# Patient Record
Sex: FEMALE | Race: OTHER | URBAN - METROPOLITAN AREA
[De-identification: names, ages, dates, MRNs, and addresses within clinical notes are randomized per-mention and may not be internally consistent; named-entity substitution may affect disease eponyms.]

---

## 2018-08-14 ENCOUNTER — EMERGENCY (EMERGENCY)
Facility: HOSPITAL | Age: 33
LOS: 1 days | Discharge: HOME | End: 2018-08-14
Attending: EMERGENCY MEDICINE

## 2018-08-14 VITALS
OXYGEN SATURATION: 98 % | DIASTOLIC BLOOD PRESSURE: 71 MMHG | HEART RATE: 63 BPM | TEMPERATURE: 96 F | SYSTOLIC BLOOD PRESSURE: 97 MMHG | RESPIRATION RATE: 20 BRPM

## 2018-08-14 DIAGNOSIS — N94.6 DYSMENORRHEA, UNSPECIFIED: ICD-10-CM

## 2018-08-14 DIAGNOSIS — Z98.890 OTHER SPECIFIED POSTPROCEDURAL STATES: ICD-10-CM

## 2018-08-14 DIAGNOSIS — R30.0 DYSURIA: ICD-10-CM

## 2018-08-14 DIAGNOSIS — Z98.891 HISTORY OF UTERINE SCAR FROM PREVIOUS SURGERY: Chronic | ICD-10-CM

## 2018-08-14 DIAGNOSIS — R10.9 UNSPECIFIED ABDOMINAL PAIN: ICD-10-CM

## 2018-08-14 DIAGNOSIS — Z87.42 PERSONAL HISTORY OF OTHER DISEASES OF THE FEMALE GENITAL TRACT: ICD-10-CM

## 2018-08-14 LAB
ALBUMIN SERPL ELPH-MCNC: 4.5 G/DL — SIGNIFICANT CHANGE UP (ref 3.5–5.2)
ALP SERPL-CCNC: 84 U/L — SIGNIFICANT CHANGE UP (ref 30–115)
ALT FLD-CCNC: 12 U/L — SIGNIFICANT CHANGE UP (ref 0–41)
ANION GAP SERPL CALC-SCNC: 14 MMOL/L — SIGNIFICANT CHANGE UP (ref 7–14)
APPEARANCE UR: ABNORMAL
AST SERPL-CCNC: 13 U/L — SIGNIFICANT CHANGE UP (ref 0–41)
BACTERIA # UR AUTO: ABNORMAL /HPF
BASOPHILS # BLD AUTO: 0.03 K/UL — SIGNIFICANT CHANGE UP (ref 0–0.2)
BASOPHILS NFR BLD AUTO: 0.7 % — SIGNIFICANT CHANGE UP (ref 0–1)
BILIRUB DIRECT SERPL-MCNC: <0.2 MG/DL — SIGNIFICANT CHANGE UP (ref 0–0.2)
BILIRUB INDIRECT FLD-MCNC: >0.2 MG/DL — SIGNIFICANT CHANGE UP (ref 0.2–1.2)
BILIRUB SERPL-MCNC: 0.4 MG/DL — SIGNIFICANT CHANGE UP (ref 0.2–1.2)
BILIRUB UR-MCNC: NEGATIVE — SIGNIFICANT CHANGE UP
BUN SERPL-MCNC: 13 MG/DL — SIGNIFICANT CHANGE UP (ref 10–20)
CALCIUM SERPL-MCNC: 9.3 MG/DL — SIGNIFICANT CHANGE UP (ref 8.5–10.1)
CHLORIDE SERPL-SCNC: 101 MMOL/L — SIGNIFICANT CHANGE UP (ref 98–110)
CO2 SERPL-SCNC: 22 MMOL/L — SIGNIFICANT CHANGE UP (ref 17–32)
COLOR SPEC: YELLOW — SIGNIFICANT CHANGE UP
CREAT SERPL-MCNC: 0.5 MG/DL — LOW (ref 0.7–1.5)
DIFF PNL FLD: ABNORMAL
EOSINOPHIL # BLD AUTO: 0.14 K/UL — SIGNIFICANT CHANGE UP (ref 0–0.7)
EOSINOPHIL NFR BLD AUTO: 3.4 % — SIGNIFICANT CHANGE UP (ref 0–8)
EPI CELLS # UR: ABNORMAL /HPF
GLUCOSE SERPL-MCNC: 101 MG/DL — HIGH (ref 70–99)
GLUCOSE UR QL: NEGATIVE — SIGNIFICANT CHANGE UP
HCT VFR BLD CALC: 42.3 % — SIGNIFICANT CHANGE UP (ref 37–47)
HGB BLD-MCNC: 13.7 G/DL — SIGNIFICANT CHANGE UP (ref 12–16)
IMM GRANULOCYTES NFR BLD AUTO: 0.2 % — SIGNIFICANT CHANGE UP (ref 0.1–0.3)
KETONES UR-MCNC: NEGATIVE — SIGNIFICANT CHANGE UP
LACTATE SERPL-SCNC: 2.2 MMOL/L — SIGNIFICANT CHANGE UP (ref 0.5–2.2)
LEUKOCYTE ESTERASE UR-ACNC: ABNORMAL
LIDOCAIN IGE QN: 36 U/L — SIGNIFICANT CHANGE UP (ref 7–60)
LYMPHOCYTES # BLD AUTO: 1.34 K/UL — SIGNIFICANT CHANGE UP (ref 1.2–3.4)
LYMPHOCYTES # BLD AUTO: 32.7 % — SIGNIFICANT CHANGE UP (ref 20.5–51.1)
MCHC RBC-ENTMCNC: 26.9 PG — LOW (ref 27–31)
MCHC RBC-ENTMCNC: 32.4 G/DL — SIGNIFICANT CHANGE UP (ref 32–37)
MCV RBC AUTO: 82.9 FL — SIGNIFICANT CHANGE UP (ref 81–99)
MONOCYTES # BLD AUTO: 0.26 K/UL — SIGNIFICANT CHANGE UP (ref 0.1–0.6)
MONOCYTES NFR BLD AUTO: 6.3 % — SIGNIFICANT CHANGE UP (ref 1.7–9.3)
NEUTROPHILS # BLD AUTO: 2.32 K/UL — SIGNIFICANT CHANGE UP (ref 1.4–6.5)
NEUTROPHILS NFR BLD AUTO: 56.7 % — SIGNIFICANT CHANGE UP (ref 42.2–75.2)
NITRITE UR-MCNC: NEGATIVE — SIGNIFICANT CHANGE UP
NRBC # BLD: 0 /100 WBCS — SIGNIFICANT CHANGE UP (ref 0–0)
PH UR: 5.5 — SIGNIFICANT CHANGE UP (ref 5–8)
PLATELET # BLD AUTO: 235 K/UL — SIGNIFICANT CHANGE UP (ref 130–400)
POTASSIUM SERPL-MCNC: 4.1 MMOL/L — SIGNIFICANT CHANGE UP (ref 3.5–5)
POTASSIUM SERPL-SCNC: 4.1 MMOL/L — SIGNIFICANT CHANGE UP (ref 3.5–5)
PROT SERPL-MCNC: 7.3 G/DL — SIGNIFICANT CHANGE UP (ref 6–8)
PROT UR-MCNC: NEGATIVE — SIGNIFICANT CHANGE UP
RBC # BLD: 5.1 M/UL — SIGNIFICANT CHANGE UP (ref 4.2–5.4)
RBC # FLD: 12.3 % — SIGNIFICANT CHANGE UP (ref 11.5–14.5)
RBC CASTS # UR COMP ASSIST: ABNORMAL /HPF
SODIUM SERPL-SCNC: 137 MMOL/L — SIGNIFICANT CHANGE UP (ref 135–146)
SP GR SPEC: >=1.03 — SIGNIFICANT CHANGE UP (ref 1.01–1.03)
UROBILINOGEN FLD QL: 0.2 — SIGNIFICANT CHANGE UP (ref 0.2–0.2)
WBC # BLD: 4.1 K/UL — LOW (ref 4.8–10.8)
WBC # FLD AUTO: 4.1 K/UL — LOW (ref 4.8–10.8)
WBC UR QL: SIGNIFICANT CHANGE UP /HPF

## 2018-08-14 RX ORDER — KETOROLAC TROMETHAMINE 30 MG/ML
30 SYRINGE (ML) INJECTION ONCE
Qty: 0 | Refills: 0 | Status: DISCONTINUED | OUTPATIENT
Start: 2018-08-14 | End: 2018-08-14

## 2018-08-14 RX ORDER — SODIUM CHLORIDE 9 MG/ML
1000 INJECTION INTRAMUSCULAR; INTRAVENOUS; SUBCUTANEOUS ONCE
Qty: 0 | Refills: 0 | Status: COMPLETED | OUTPATIENT
Start: 2018-08-14 | End: 2018-08-14

## 2018-08-14 RX ORDER — IBUPROFEN 200 MG
1 TABLET ORAL
Qty: 20 | Refills: 0
Start: 2018-08-14 | End: 2018-08-18

## 2018-08-14 RX ADMIN — SODIUM CHLORIDE 1000 MILLILITER(S): 9 INJECTION INTRAMUSCULAR; INTRAVENOUS; SUBCUTANEOUS at 06:20

## 2018-08-14 RX ADMIN — Medication 30 MILLIGRAM(S): at 08:53

## 2018-08-14 NOTE — ED PROVIDER NOTE - ATTENDING CONTRIBUTION TO CARE
33y f h/o ovarian cysts p/w LLQ pain since 2am. Constant, occ radiating up L side of abd. Also endorses recent dysuria. Denies f/c, cp/sob, nvd, hematuria, vag discharge or rash. Ob hx - , C/s x 2. LMP 4d ago. PE: young f nad, ncat, neck supple, rrr nl s1s2 no mrg, ctab no wrr, abd soft nd +llq tenderness rest non-tender no palpable masses no rgr, no cvat, ext nl. a/p: LLQ/?flank pain, dysuria, h/o ovarian cysts - urine, labs, pelvic US, reassess.

## 2018-08-14 NOTE — ED PROVIDER NOTE - PHYSICAL EXAMINATION
VITAL SIGNS: I have reviewed nursing notes and confirm.  CONSTITUTIONAL: Well-developed; well-nourished; in no acute distress.  SKIN: Skin exam is warm and dry, no acute rash.  HEAD: Normocephalic; atraumatic.  EYES: Conjunctiva and sclera clear.  ENT: No nasal discharge; airway clear.   NECK: Supple; non tender.  CARD: S1, S2 normal; no murmurs, gallops, or rubs. Regular rate and rhythm.  RESP: No wheezes, rales or rhonchi. Speaking in full sentences.   ABD: Normal bowel sounds; soft; non-distended; (+) mild left pelvic TTP. No rebound or guarding. No CVA tenderness.   EXT: Normal ROM. No clubbing, cyanosis or edema.  NEURO: Alert, oriented. Grossly unremarkable. No focal deficits.

## 2018-08-14 NOTE — ED PROVIDER NOTE - MEDICAL DECISION MAKING DETAILS
Pain improved.  U/S shows no signs of torsion.  UA with blood but patient currently menstruating.  Will give NSAIDs and d/c home.  Has GYN follow up on Friday.

## 2018-08-14 NOTE — ED ADULT TRIAGE NOTE - CHIEF COMPLAINT QUOTE
Patient presents to ED with complaints of left lower abdominal pain starting 2 hours ago. LMP 4 days ago. Patient alert and in no distress in triage.
Oriented - self; Oriented - place; Oriented - time

## 2018-08-14 NOTE — ED PROVIDER NOTE - OBJECTIVE STATEMENT
32 yo F with PMHx of ovarian cyst presents to the ED c/o left lower abdominal pain that started last night. Pain was been intermittent since onset. Pt describes the pain as sharp. Pt took Tylenol with mild relief of symptoms. Pt is currently on her menstrual period. Pt denies fever, chills, nausea, vomiting, diarrhea, headache, dizziness, weakness, chest pain, SOB, back pain, LOC, trauma, urinary symptoms, cough, calf pain/swelling, recent surgery. 32 yo F with PMHx of ovarian cyst presents to the ED c/o left lower abdominal pain that started last night. Pain was been intermittent since onset. Pt describes the pain as sharp. She admits to associated burning with urination. Pt took Tylenol with mild relief of symptoms. Pt is currently on her menstrual period. Pt denies fever, chills, nausea, vomiting, diarrhea, headache, dizziness, weakness, chest pain, SOB, back pain, LOC, trauma, cough, calf pain/swelling, recent surgery.

## 2018-08-14 NOTE — ED ADULT NURSE NOTE - CHIEF COMPLAINT QUOTE
Patient presents to ED with complaints of left lower abdominal pain starting 2 hours ago. LMP 4 days ago. Patient alert and in no distress in triage.

## 2018-08-14 NOTE — ED ADULT NURSE NOTE - OBJECTIVE STATEMENT
Pt presents with LLQ pain x 2 hours. Pt states pain is intermittent. Pt currently on her period. Pt denies chest pain, sob, chills, fever, n/v. Pt with hx of ovarian cyst and

## 2019-03-02 ENCOUNTER — EMERGENCY (EMERGENCY)
Facility: HOSPITAL | Age: 34
LOS: 0 days | Discharge: HOME | End: 2019-03-02
Attending: EMERGENCY MEDICINE | Admitting: EMERGENCY MEDICINE

## 2019-03-02 VITALS — TEMPERATURE: 96 F

## 2019-03-02 VITALS
SYSTOLIC BLOOD PRESSURE: 124 MMHG | DIASTOLIC BLOOD PRESSURE: 70 MMHG | TEMPERATURE: 94 F | OXYGEN SATURATION: 98 % | RESPIRATION RATE: 18 BRPM | HEART RATE: 97 BPM

## 2019-03-02 DIAGNOSIS — R10.13 EPIGASTRIC PAIN: ICD-10-CM

## 2019-03-02 DIAGNOSIS — R11.2 NAUSEA WITH VOMITING, UNSPECIFIED: ICD-10-CM

## 2019-03-02 DIAGNOSIS — R19.7 DIARRHEA, UNSPECIFIED: ICD-10-CM

## 2019-03-02 DIAGNOSIS — Z98.891 HISTORY OF UTERINE SCAR FROM PREVIOUS SURGERY: Chronic | ICD-10-CM

## 2019-03-02 LAB
ALBUMIN SERPL ELPH-MCNC: 5 G/DL — SIGNIFICANT CHANGE UP (ref 3.5–5.2)
ALP SERPL-CCNC: 91 U/L — SIGNIFICANT CHANGE UP (ref 30–115)
ALT FLD-CCNC: 16 U/L — SIGNIFICANT CHANGE UP (ref 0–41)
ANION GAP SERPL CALC-SCNC: 15 MMOL/L — HIGH (ref 7–14)
AST SERPL-CCNC: 15 U/L — SIGNIFICANT CHANGE UP (ref 0–41)
BASOPHILS # BLD AUTO: 0.02 K/UL — SIGNIFICANT CHANGE UP (ref 0–0.2)
BASOPHILS NFR BLD AUTO: 0.2 % — SIGNIFICANT CHANGE UP (ref 0–1)
BILIRUB SERPL-MCNC: 0.3 MG/DL — SIGNIFICANT CHANGE UP (ref 0.2–1.2)
BUN SERPL-MCNC: 17 MG/DL — SIGNIFICANT CHANGE UP (ref 10–20)
CALCIUM SERPL-MCNC: 9.9 MG/DL — SIGNIFICANT CHANGE UP (ref 8.5–10.1)
CHLORIDE SERPL-SCNC: 102 MMOL/L — SIGNIFICANT CHANGE UP (ref 98–110)
CO2 SERPL-SCNC: 23 MMOL/L — SIGNIFICANT CHANGE UP (ref 17–32)
CREAT SERPL-MCNC: 0.7 MG/DL — SIGNIFICANT CHANGE UP (ref 0.7–1.5)
EOSINOPHIL # BLD AUTO: 0.09 K/UL — SIGNIFICANT CHANGE UP (ref 0–0.7)
EOSINOPHIL NFR BLD AUTO: 0.7 % — SIGNIFICANT CHANGE UP (ref 0–8)
GLUCOSE SERPL-MCNC: 110 MG/DL — HIGH (ref 70–99)
HCG SERPL QL: NEGATIVE — SIGNIFICANT CHANGE UP
HCT VFR BLD CALC: 46.4 % — SIGNIFICANT CHANGE UP (ref 37–47)
HGB BLD-MCNC: 14.9 G/DL — SIGNIFICANT CHANGE UP (ref 12–16)
IMM GRANULOCYTES NFR BLD AUTO: 0.3 % — SIGNIFICANT CHANGE UP (ref 0.1–0.3)
LACTATE SERPL-SCNC: 1.2 MMOL/L — SIGNIFICANT CHANGE UP (ref 0.5–2.2)
LIDOCAIN IGE QN: 26 U/L — SIGNIFICANT CHANGE UP (ref 7–60)
LYMPHOCYTES # BLD AUTO: 0.95 K/UL — LOW (ref 1.2–3.4)
LYMPHOCYTES # BLD AUTO: 7.7 % — LOW (ref 20.5–51.1)
MCHC RBC-ENTMCNC: 27.2 PG — SIGNIFICANT CHANGE UP (ref 27–31)
MCHC RBC-ENTMCNC: 32.1 G/DL — SIGNIFICANT CHANGE UP (ref 32–37)
MCV RBC AUTO: 84.8 FL — SIGNIFICANT CHANGE UP (ref 81–99)
MONOCYTES # BLD AUTO: 0.46 K/UL — SIGNIFICANT CHANGE UP (ref 0.1–0.6)
MONOCYTES NFR BLD AUTO: 3.7 % — SIGNIFICANT CHANGE UP (ref 1.7–9.3)
NEUTROPHILS # BLD AUTO: 10.72 K/UL — HIGH (ref 1.4–6.5)
NEUTROPHILS NFR BLD AUTO: 87.4 % — HIGH (ref 42.2–75.2)
NRBC # BLD: 0 /100 WBCS — SIGNIFICANT CHANGE UP (ref 0–0)
PLATELET # BLD AUTO: 278 K/UL — SIGNIFICANT CHANGE UP (ref 130–400)
POTASSIUM SERPL-MCNC: 4.1 MMOL/L — SIGNIFICANT CHANGE UP (ref 3.5–5)
POTASSIUM SERPL-SCNC: 4.1 MMOL/L — SIGNIFICANT CHANGE UP (ref 3.5–5)
PROT SERPL-MCNC: 8.3 G/DL — HIGH (ref 6–8)
RBC # BLD: 5.47 M/UL — HIGH (ref 4.2–5.4)
RBC # FLD: 12.5 % — SIGNIFICANT CHANGE UP (ref 11.5–14.5)
SODIUM SERPL-SCNC: 140 MMOL/L — SIGNIFICANT CHANGE UP (ref 135–146)
WBC # BLD: 12.28 K/UL — HIGH (ref 4.8–10.8)
WBC # FLD AUTO: 12.28 K/UL — HIGH (ref 4.8–10.8)

## 2019-03-02 RX ORDER — FAMOTIDINE 10 MG/ML
20 INJECTION INTRAVENOUS ONCE
Qty: 0 | Refills: 0 | Status: COMPLETED | OUTPATIENT
Start: 2019-03-02 | End: 2019-03-02

## 2019-03-02 RX ORDER — ONDANSETRON 8 MG/1
1 TABLET, FILM COATED ORAL
Qty: 9 | Refills: 0
Start: 2019-03-02 | End: 2019-03-04

## 2019-03-02 RX ORDER — SODIUM CHLORIDE 9 MG/ML
1000 INJECTION, SOLUTION INTRAVENOUS ONCE
Qty: 0 | Refills: 0 | Status: COMPLETED | OUTPATIENT
Start: 2019-03-02 | End: 2019-03-02

## 2019-03-02 RX ORDER — METOCLOPRAMIDE HCL 10 MG
10 TABLET ORAL ONCE
Qty: 0 | Refills: 0 | Status: COMPLETED | OUTPATIENT
Start: 2019-03-02 | End: 2019-03-02

## 2019-03-02 RX ORDER — ONDANSETRON 8 MG/1
4 TABLET, FILM COATED ORAL ONCE
Qty: 0 | Refills: 0 | Status: COMPLETED | OUTPATIENT
Start: 2019-03-02 | End: 2019-03-02

## 2019-03-02 RX ADMIN — SODIUM CHLORIDE 1000 MILLILITER(S): 9 INJECTION, SOLUTION INTRAVENOUS at 20:47

## 2019-03-02 RX ADMIN — Medication 20 MILLIGRAM(S): at 20:46

## 2019-03-02 RX ADMIN — Medication 104 MILLIGRAM(S): at 20:47

## 2019-03-02 RX ADMIN — FAMOTIDINE 20 MILLIGRAM(S): 10 INJECTION INTRAVENOUS at 20:35

## 2019-03-02 RX ADMIN — ONDANSETRON 4 MILLIGRAM(S): 8 TABLET, FILM COATED ORAL at 20:35

## 2019-03-02 RX ADMIN — SODIUM CHLORIDE 1000 MILLILITER(S): 9 INJECTION, SOLUTION INTRAVENOUS at 20:35

## 2019-03-02 NOTE — ED PROVIDER NOTE - CARE PLAN
Principal Discharge DX:	Gastroenteritis Principal Discharge DX:	Nausea and vomiting  Secondary Diagnosis:	Diarrhea

## 2019-03-02 NOTE — ED PROVIDER NOTE - NSFOLLOWUPINSTRUCTIONS_ED_ALL_ED_FT
Nausea / Vomiting    Nausea is the feeling that you have an upset stomach or have to vomit. As nausea gets worse, it can lead to vomiting. Vomiting occurs when stomach contents are thrown up and out of the mouth which puts you at an increased risk for dehydration. Older adults and people with other diseases or a weak immune system are at higher risk for dehydration. Drink clear fluids in small but frequent amounts as tolerated. Eat bland, easy-to-digest foods in small amounts as tolerated.     SEEK IMMEDIATE MEDICAL CARE IF YOU HAVE THE FOLLOWING SYMPTOMS: fever, inability to keep fluids down, black or bloody vomitus, black or bloody stools, lightheadedness/dizziness, chest pain, severe headache, rash, shortness of breath, cold or clammy skin, confusion, pain with urination, or any signs of dehydration.     Diarrhea    Diarrhea is frequent loose and watery bowel movements that has many causes. Diarrhea can make you feel weak and cause you to become dehydrated. Diarrhea typically lasts 2–3 days. However, it can last longer if it is a sign of something more serious. Drink clear fluids to prevent dehydration. Eat bland, easy-to-digest foods as tolerated.     SEEK IMMEDIATE MEDICAL CARE IF YOU HAVE THE FOLLOWING SYMPTOMS: fever, lightheadedness/dizziness, chest pain, black or bloody stools, shortness of breath, severe abdominal or back pain, or any signs of dehydration.

## 2019-03-02 NOTE — ED PROVIDER NOTE - ATTENDING CONTRIBUTION TO CARE
34 yo f with no pmh, presents with c/o n/v/d started about 1.5 hrs pta.  pt was [place] [employer] with  when she started feeling unwell and nauseated.  pt vomited several times.  arrived home and had several episodes of watery, nonbloody diarrhea.  pt c/o mild cramping/pressure at the epigastrium.  no cp, no sob.  pt and  admit kids at home have similar, less severe symptoms.  no fever no chills.  no urinary sx. exam: nad, ncat, perrl, eomi, mmm, rrr, ctab, abd soft, mildly ttp epig, no rebound, no guarding imp: pt with n/v/d, sick contacts at home, will check labs, symptomatic treatment, ivf, reassess

## 2019-03-02 NOTE — ED PROVIDER NOTE - NS ED ROS FT
Review of Systems:  	•	CONSTITUTIONAL - no fever, no diaphoresis, no chills  	•	SKIN - no rash  	•	HEMATOLOGIC - no bleeding, no bruising  	•	EYES - no eye pain, no blurry vision  	•	ENT - no congestion  	•	RESPIRATORY - no shortness of breath, no cough  	•	CARDIAC - no chest pain, no palpitations  	•	GI - +abd pain, +nausea, +vomiting, +diarrhea, no constipation  	•	GENITO-URINARY - no dysuria; no hematuria, no increased urinary frequency  	•	MUSCULOSKELETAL - no joint paint, no swelling, no redness  	•	NEUROLOGIC - no weakness, no headache, no paresthesias, no LOC  	•	PSYCH - no anxiety, no depression  	All other ROS are negative except as documented in HPI.

## 2019-03-02 NOTE — ED PROVIDER NOTE - CLINICAL SUMMARY MEDICAL DECISION MAKING FREE TEXT BOX
Pt with n/v/d, +sick contacts, likely gastroenteritis.  feeling improved after symptomatic treatment, mild leukocytosis on labs.  but afebrile.  pt dc home with

## 2019-03-02 NOTE — ED PROVIDER NOTE - OBJECTIVE STATEMENT
32 yo F with no pmhx, pshx C-sextion x 2 presenting for evaluation of multiple episodes of nbnb vomiting and non-bloody diarrhea which recently started about 1 hour and half prior to arrival associated with mild, burning, non-radiating epigastric pain. States 2 children sick at home 1 with URI symptoms 1 with vomiting. No cp, sob, fever, chills, back pain, urinary symptoms, headache, dizziness, paresthesias, or weakness.

## 2019-03-02 NOTE — ED PROVIDER NOTE - PHYSICAL EXAMINATION
VITAL SIGNS: I have reviewed nursing notes and confirm.  CONSTITUTIONAL: Well-developed; well-nourished; in no acute distress.  SKIN: Skin exam is warm and dry, no acute rash.  HEAD: Normocephalic; atraumatic.  EYES: PERRL, EOM intact; conjunctiva and sclera clear.  ENT: No nasal discharge; airway clear.   NECK: Supple; non tender.  CARD: S1, S2 normal; no murmurs, gallops, or rubs. Regular rate and rhythm.  RESP: Clear to auscultation bilaterally. No wheezes, rales or rhonchi.  ABD: Normal bowel sounds; soft; non-distended; +mild epigastric tenderness. No rebound tenderness or guarding.   EXT: Normal ROM. No edema.  LYMPH: No acute cervical adenopathy.  NEURO: Alert, oriented. Grossly unremarkable. No focal deficits.  PSYCH: Cooperative, appropriate.

## 2019-03-03 PROBLEM — N83.209 UNSPECIFIED OVARIAN CYST, UNSPECIFIED SIDE: Chronic | Status: ACTIVE | Noted: 2018-08-14

## 2019-10-13 ENCOUNTER — EMERGENCY (EMERGENCY)
Facility: HOSPITAL | Age: 34
LOS: 0 days | Discharge: HOME | End: 2019-10-13
Attending: EMERGENCY MEDICINE | Admitting: EMERGENCY MEDICINE
Payer: MEDICAID

## 2019-10-13 VITALS
OXYGEN SATURATION: 100 % | HEART RATE: 83 BPM | TEMPERATURE: 97 F | RESPIRATION RATE: 84 BRPM | SYSTOLIC BLOOD PRESSURE: 117 MMHG | DIASTOLIC BLOOD PRESSURE: 74 MMHG

## 2019-10-13 VITALS
SYSTOLIC BLOOD PRESSURE: 112 MMHG | RESPIRATION RATE: 18 BRPM | DIASTOLIC BLOOD PRESSURE: 73 MMHG | TEMPERATURE: 98 F | HEART RATE: 88 BPM

## 2019-10-13 DIAGNOSIS — Z98.891 HISTORY OF UTERINE SCAR FROM PREVIOUS SURGERY: Chronic | ICD-10-CM

## 2019-10-13 DIAGNOSIS — R10.30 LOWER ABDOMINAL PAIN, UNSPECIFIED: ICD-10-CM

## 2019-10-13 DIAGNOSIS — Z3A.01 LESS THAN 8 WEEKS GESTATION OF PREGNANCY: ICD-10-CM

## 2019-10-13 DIAGNOSIS — N93.9 ABNORMAL UTERINE AND VAGINAL BLEEDING, UNSPECIFIED: ICD-10-CM

## 2019-10-13 DIAGNOSIS — O20.8 OTHER HEMORRHAGE IN EARLY PREGNANCY: ICD-10-CM

## 2019-10-13 LAB
ALBUMIN SERPL ELPH-MCNC: 4.4 G/DL — SIGNIFICANT CHANGE UP (ref 3.5–5.2)
ALP SERPL-CCNC: 77 U/L — SIGNIFICANT CHANGE UP (ref 30–115)
ALT FLD-CCNC: 11 U/L — SIGNIFICANT CHANGE UP (ref 0–41)
ANION GAP SERPL CALC-SCNC: 16 MMOL/L — HIGH (ref 7–14)
APPEARANCE UR: CLEAR — SIGNIFICANT CHANGE UP
AST SERPL-CCNC: 16 U/L — SIGNIFICANT CHANGE UP (ref 0–41)
BACTERIA # UR AUTO: NEGATIVE — SIGNIFICANT CHANGE UP
BASOPHILS # BLD AUTO: 0.03 K/UL — SIGNIFICANT CHANGE UP (ref 0–0.2)
BASOPHILS NFR BLD AUTO: 0.4 % — SIGNIFICANT CHANGE UP (ref 0–1)
BILIRUB SERPL-MCNC: <0.2 MG/DL — SIGNIFICANT CHANGE UP (ref 0.2–1.2)
BILIRUB UR-MCNC: NEGATIVE — SIGNIFICANT CHANGE UP
BLD GP AB SCN SERPL QL: SIGNIFICANT CHANGE UP
BUN SERPL-MCNC: 12 MG/DL — SIGNIFICANT CHANGE UP (ref 10–20)
CALCIUM SERPL-MCNC: 9.6 MG/DL — SIGNIFICANT CHANGE UP (ref 8.5–10.1)
CHLORIDE SERPL-SCNC: 102 MMOL/L — SIGNIFICANT CHANGE UP (ref 98–110)
CO2 SERPL-SCNC: 21 MMOL/L — SIGNIFICANT CHANGE UP (ref 17–32)
COLOR SPEC: SIGNIFICANT CHANGE UP
CREAT SERPL-MCNC: 0.5 MG/DL — LOW (ref 0.7–1.5)
DIFF PNL FLD: ABNORMAL
EOSINOPHIL # BLD AUTO: 0.11 K/UL — SIGNIFICANT CHANGE UP (ref 0–0.7)
EOSINOPHIL NFR BLD AUTO: 1.3 % — SIGNIFICANT CHANGE UP (ref 0–8)
EPI CELLS # UR: 1 /HPF — SIGNIFICANT CHANGE UP (ref 0–5)
GLUCOSE SERPL-MCNC: 91 MG/DL — SIGNIFICANT CHANGE UP (ref 70–99)
GLUCOSE UR QL: NEGATIVE — SIGNIFICANT CHANGE UP
HCG SERPL-ACNC: HIGH MIU/ML
HCT VFR BLD CALC: 39.7 % — SIGNIFICANT CHANGE UP (ref 37–47)
HGB BLD-MCNC: 12.8 G/DL — SIGNIFICANT CHANGE UP (ref 12–16)
HYALINE CASTS # UR AUTO: 0 /LPF — SIGNIFICANT CHANGE UP (ref 0–7)
IMM GRANULOCYTES NFR BLD AUTO: 0.4 % — HIGH (ref 0.1–0.3)
KETONES UR-MCNC: NEGATIVE — SIGNIFICANT CHANGE UP
LEUKOCYTE ESTERASE UR-ACNC: NEGATIVE — SIGNIFICANT CHANGE UP
LYMPHOCYTES # BLD AUTO: 1.48 K/UL — SIGNIFICANT CHANGE UP (ref 1.2–3.4)
LYMPHOCYTES # BLD AUTO: 17.8 % — LOW (ref 20.5–51.1)
MCHC RBC-ENTMCNC: 27.8 PG — SIGNIFICANT CHANGE UP (ref 27–31)
MCHC RBC-ENTMCNC: 32.2 G/DL — SIGNIFICANT CHANGE UP (ref 32–37)
MCV RBC AUTO: 86.3 FL — SIGNIFICANT CHANGE UP (ref 81–99)
MONOCYTES # BLD AUTO: 0.4 K/UL — SIGNIFICANT CHANGE UP (ref 0.1–0.6)
MONOCYTES NFR BLD AUTO: 4.8 % — SIGNIFICANT CHANGE UP (ref 1.7–9.3)
NEUTROPHILS # BLD AUTO: 6.25 K/UL — SIGNIFICANT CHANGE UP (ref 1.4–6.5)
NEUTROPHILS NFR BLD AUTO: 75.3 % — HIGH (ref 42.2–75.2)
NITRITE UR-MCNC: NEGATIVE — SIGNIFICANT CHANGE UP
NRBC # BLD: 0 /100 WBCS — SIGNIFICANT CHANGE UP (ref 0–0)
PH UR: 6 — SIGNIFICANT CHANGE UP (ref 5–8)
PLATELET # BLD AUTO: 238 K/UL — SIGNIFICANT CHANGE UP (ref 130–400)
POTASSIUM SERPL-MCNC: 4.3 MMOL/L — SIGNIFICANT CHANGE UP (ref 3.5–5)
POTASSIUM SERPL-SCNC: 4.3 MMOL/L — SIGNIFICANT CHANGE UP (ref 3.5–5)
PROT SERPL-MCNC: 7.4 G/DL — SIGNIFICANT CHANGE UP (ref 6–8)
PROT UR-MCNC: NEGATIVE — SIGNIFICANT CHANGE UP
RBC # BLD: 4.6 M/UL — SIGNIFICANT CHANGE UP (ref 4.2–5.4)
RBC # FLD: 12.3 % — SIGNIFICANT CHANGE UP (ref 11.5–14.5)
RBC CASTS # UR COMP ASSIST: 1 /HPF — SIGNIFICANT CHANGE UP (ref 0–4)
SODIUM SERPL-SCNC: 139 MMOL/L — SIGNIFICANT CHANGE UP (ref 135–146)
SP GR SPEC: 1.02 — SIGNIFICANT CHANGE UP (ref 1.01–1.02)
UROBILINOGEN FLD QL: SIGNIFICANT CHANGE UP
WBC # BLD: 8.3 K/UL — SIGNIFICANT CHANGE UP (ref 4.8–10.8)
WBC # FLD AUTO: 8.3 K/UL — SIGNIFICANT CHANGE UP (ref 4.8–10.8)
WBC UR QL: 0 /HPF — SIGNIFICANT CHANGE UP (ref 0–5)

## 2019-10-13 PROCEDURE — 99284 EMERGENCY DEPT VISIT MOD MDM: CPT

## 2019-10-13 PROCEDURE — 76856 US EXAM PELVIC COMPLETE: CPT | Mod: 26

## 2019-10-13 NOTE — ED PROVIDER NOTE - ATTENDING CONTRIBUTION TO CARE
34y f  @ ega 6 wks (lmp est 19) p/w vag bleeding x 1d. Pos home preg test last week, as well as in ObGyn Dr. Templeton's office few d ago. Is scheduled for first US next week, no IUP confirmed yet. Started having vag bleeding & lower abd cramping today, has used 3 pads today, no clots. No dizziness, cp/sob, nv, flank pain, urinary sx. PE: young f wdwn nad, ncat, neck supple, rrr nl s1s2 no mrg, ctab no wrr, abd soft ntnd no palpable masses no rgr, no cvat, ext no cce dpi.

## 2019-10-13 NOTE — ED PROVIDER NOTE - NS ED ROS FT
CONSTITUTIONAL: (-) fevers, (-) chills  CARDIO: (-) chest pain, (-) palpitations  PULM: (-) cough, (-) sputum, (-) shortness of breath  GI: see HPI, (-) nausea, (-) vomiting, (-) diarrhea, (-) constipation  : see HPI, (-) dysuria, (-) hematuria, (-) frequency  SKIN: (-) rashes, (-) wounds, (-) pallor  NEURO: (-) headache, (-) dizziness, (-) lightheadedness, (-) syncope, (-) weakness    *all other systems negative except as documented above and in the HPI*

## 2019-10-13 NOTE — ED PROVIDER NOTE - PATIENT PORTAL LINK FT
You can access the FollowMyHealth Patient Portal offered by [facility] by registering at the following website: [email or web address]. By joining [employer]’s FollowMyHealth portal, you will also be able to view your health information using other applications (apps) compatible with our system.

## 2019-10-13 NOTE — ED PROVIDER NOTE - CLINICAL SUMMARY MEDICAL DECISION MAKING FREE TEXT BOX
vag bleeding - +IUP, subchorionic hematoma, Rh(+) - all results d/w pt & copies given, strict return precautions discussed, rec outpt ObGyn f/u

## 2019-10-13 NOTE — ED PROVIDER NOTE - PHYSICAL EXAMINATION
VITALS:  I have reviewed the initial vital signs.  GENERAL: Well-developed, well-nourished, in no acute distress. Nontoxic.  HEENT: Sclera clear. No conjunctival pallor. EOMI, PERRLA. Mucous membranes moist.  CARDIO: RRR, nl S1 and S2. No murmurs, rubs, or gallops.  PULM: Normal effort. CTA b/l without wheezes, rales, or rhonchi.  MSK: Normal, steady gait.  GI: Normal bowel sounds. Abdomen soft and non-distended. Nontender in all four quadrants without rebound or guarding.  : No CVA tenderness b/l.  SKIN: Warm, dry. No pallor or rashes. Capillary refill <2 seconds.  NEURO: A&Ox3. Speech clear. No gross motor/sensory deficits.

## 2019-10-13 NOTE — ED PROVIDER NOTE - OBJECTIVE STATEMENT
34 year old female  LMP early september (hx of irregular periods), c section x 2 presents to the ED for constant, mild vaginal bleeding since earlier this morning. States she has needed to change pads 3 times today. No clots or tissue seen. Also endorses mild, intermittent lower abdominal cramping. States she has seen her OB for a confirmatory upreg earlier this week, first US scheduled for next week. Denies fevers/chills, chest pain, shortness of breath, syncope, nausea, vomiting, diarrhea, irritative voiding symptoms, back/flank pain, recent illness, trauma.

## 2019-10-13 NOTE — ED PROVIDER NOTE - NSFOLLOWUPINSTRUCTIONS_ED_ALL_ED_FT
VAGINAL BLEEDING DURING PREGNANCY, FIRST TRIMESTER    A small amount of bleeding (spotting) from the vagina is relatively common in early pregnancy. It usually stops on its own. Various things may cause bleeding or spotting in early pregnancy. Some bleeding may be related to the pregnancy, and some may not. In most cases, the bleeding is normal and is not a problem. However, bleeding can also be a sign of something serious. Be sure to tell your health care provider about any vaginal bleeding right away.    Some possible causes of vaginal bleeding during the first trimester include:     Infection or inflammation of the cervix.  Growths (polyps) on the cervix.  Miscarriage or threatened miscarriage.  Pregnancy tissue has developed outside of the uterus and in a fallopian tube (tubal pregnancy).  Tiny cysts have developed in the uterus instead of pregnancy tissue (molar pregnancy).    HOME CARE INSTRUCTIONS  Watch your condition for any changes. The following actions may help to lessen any discomfort you are feeling:    Follow your health care provider's instructions for limiting your activity. If your health care provider orders bed rest, you may need to stay in bed and only get up to use the bathroom. However, your health care provider may allow you to continue light activity.  If needed, make plans for someone to help with your regular activities and responsibilities while you are on bed rest.  Keep track of the number of pads you use each day, how often you change pads, and how soaked (saturated) they are. Write this down.  Do not use tampons. Do not douche.  Do not have sexual intercourse or orgasms until approved by your health care provider.  If you pass any tissue from your vagina, save the tissue so you can show it to your health care provider.  Only take over-the-counter or prescription medicines as directed by your health care provider.   Do not take aspirin because it can make you bleed.  Keep all follow-up appointments as directed by your health care provider.    SEEK MEDICAL CARE IF:  You have any vaginal bleeding during any part of your pregnancy.  You have cramps or labor pains.   You have a fever, not controlled by medicine.     SEEK IMMEDIATE MEDICAL CARE IF:  You have severe cramps in your back or belly (abdomen).  You pass large clots or tissue from your vagina.   Your bleeding increases.  You feel light-headed or weak, or you have fainting episodes.  You have chills.  You are leaking fluid or have a gush of fluid from your vagina.  You pass out while having a bowel movement.     MAKE SURE YOU:  Understand these instructions.  Will watch your condition.  Will get help right away if you are not doing well or get worse.    ADDITIONAL NOTES AND INSTRUCTIONS    Please follow up with your Primary MD in 24-48 hr.  Seek immediate medical care for any new/worsening signs or symptoms.    Subchorionic Hematoma    A subchorionic hematoma is a gathering of blood between the outer wall of the placenta and the inner wall of the womb (uterus). The placenta is the organ that connects the fetus to the wall of the uterus. The placenta performs the feeding, breathing (oxygen to the fetus), and waste removal (excretory work) of the fetus.     Subchorionic hematoma is the most common abnormality found on a result from ultrasonography done during the first trimester or early second trimester of pregnancy. If there has been little or no vaginal bleeding, early small hematomas usually shrink on their own and do not affect your baby or pregnancy. The blood is gradually absorbed over 1–2 weeks. When bleeding starts later in pregnancy or the hematoma is larger or occurs in an older pregnant woman, the outcome may not be as good. Larger hematomas may get bigger, which increases the chances for miscarriage. Subchorionic hematoma also increases the risk of premature detachment of the placenta from the uterus,  (premature) labor, and stillbirth.    HOME CARE INSTRUCTIONS  Stay on bed rest if your health care provider recommends this. Although bed rest will not prevent more bleeding or prevent a miscarriage, your health care provider may recommend bed rest until you are advised otherwise.  Avoid heavy lifting (more than 10 lb [4.5 kg]), exercise, sexual intercourse, or douching as directed by your health care provider.  Keep track of the number of pads you use each day and how soaked (saturated) they are. Write down this information.  Do not use tampons.  Keep all follow-up appointments as directed by your health care provider. Your health care provider may ask you to have follow-up blood tests or ultrasound tests or both.    SEEK IMMEDIATE MEDICAL CARE IF:  You have severe cramps in your stomach, back, abdomen, or pelvis.  You have a fever.  You pass large clots or tissue. Save any tissue for your health care provider to look at.  Your bleeding increases or you become lightheaded, feel weak, or have fainting episodes.    ADDITIONAL NOTES AND INSTRUCTIONS    Please follow up with your Primary MD in 24-48 hr.  Seek immediate medical care for any new/worsening signs or symptoms.

## 2019-10-13 NOTE — ED PROVIDER NOTE - CARE PROVIDER_API CALL
Jefry Templeton)  Obstetrics and Gynecology  [address] [place] [address]  Phone: [phone]  Fax: [phone]  Follow Up Time:

## 2019-10-14 LAB
CULTURE RESULTS: SIGNIFICANT CHANGE UP
SPECIMEN SOURCE: SIGNIFICANT CHANGE UP

## 2019-10-17 ENCOUNTER — EMERGENCY (EMERGENCY)
Facility: HOSPITAL | Age: 34
LOS: 0 days | Discharge: HOME | End: 2019-10-17
Attending: EMERGENCY MEDICINE | Admitting: EMERGENCY MEDICINE
Payer: MEDICAID

## 2019-10-17 VITALS
HEART RATE: 85 BPM | SYSTOLIC BLOOD PRESSURE: 126 MMHG | TEMPERATURE: 99 F | RESPIRATION RATE: 18 BRPM | DIASTOLIC BLOOD PRESSURE: 78 MMHG | OXYGEN SATURATION: 100 %

## 2019-10-17 VITALS
TEMPERATURE: 98 F | RESPIRATION RATE: 16 BRPM | DIASTOLIC BLOOD PRESSURE: 77 MMHG | OXYGEN SATURATION: 100 % | SYSTOLIC BLOOD PRESSURE: 121 MMHG | HEART RATE: 87 BPM

## 2019-10-17 DIAGNOSIS — O20.9 HEMORRHAGE IN EARLY PREGNANCY, UNSPECIFIED: ICD-10-CM

## 2019-10-17 DIAGNOSIS — Z98.891 HISTORY OF UTERINE SCAR FROM PREVIOUS SURGERY: Chronic | ICD-10-CM

## 2019-10-17 DIAGNOSIS — R10.30 LOWER ABDOMINAL PAIN, UNSPECIFIED: ICD-10-CM

## 2019-10-17 DIAGNOSIS — N93.9 ABNORMAL UTERINE AND VAGINAL BLEEDING, UNSPECIFIED: ICD-10-CM

## 2019-10-17 DIAGNOSIS — O99.89 OTHER SPECIFIED DISEASES AND CONDITIONS COMPLICATING PREGNANCY, CHILDBIRTH AND THE PUERPERIUM: ICD-10-CM

## 2019-10-17 DIAGNOSIS — Z3A.01 LESS THAN 8 WEEKS GESTATION OF PREGNANCY: ICD-10-CM

## 2019-10-17 LAB
ALBUMIN SERPL ELPH-MCNC: 4.4 G/DL — SIGNIFICANT CHANGE UP (ref 3.5–5.2)
ALP SERPL-CCNC: 88 U/L — SIGNIFICANT CHANGE UP (ref 30–115)
ALT FLD-CCNC: 11 U/L — SIGNIFICANT CHANGE UP (ref 0–41)
ANION GAP SERPL CALC-SCNC: 14 MMOL/L — SIGNIFICANT CHANGE UP (ref 7–14)
APPEARANCE UR: CLEAR — SIGNIFICANT CHANGE UP
AST SERPL-CCNC: 31 U/L — SIGNIFICANT CHANGE UP (ref 0–41)
BACTERIA # UR AUTO: ABNORMAL
BASOPHILS # BLD AUTO: 0.03 K/UL — SIGNIFICANT CHANGE UP (ref 0–0.2)
BASOPHILS NFR BLD AUTO: 0.3 % — SIGNIFICANT CHANGE UP (ref 0–1)
BILIRUB SERPL-MCNC: <0.2 MG/DL — SIGNIFICANT CHANGE UP (ref 0.2–1.2)
BILIRUB UR-MCNC: NEGATIVE — SIGNIFICANT CHANGE UP
BUN SERPL-MCNC: 13 MG/DL — SIGNIFICANT CHANGE UP (ref 10–20)
CALCIUM SERPL-MCNC: 9.7 MG/DL — SIGNIFICANT CHANGE UP (ref 8.5–10.1)
CHLORIDE SERPL-SCNC: 100 MMOL/L — SIGNIFICANT CHANGE UP (ref 98–110)
CO2 SERPL-SCNC: 20 MMOL/L — SIGNIFICANT CHANGE UP (ref 17–32)
COLOR SPEC: SIGNIFICANT CHANGE UP
CREAT SERPL-MCNC: 0.6 MG/DL — LOW (ref 0.7–1.5)
DIFF PNL FLD: ABNORMAL
EOSINOPHIL # BLD AUTO: 0.14 K/UL — SIGNIFICANT CHANGE UP (ref 0–0.7)
EOSINOPHIL NFR BLD AUTO: 1.5 % — SIGNIFICANT CHANGE UP (ref 0–8)
EPI CELLS # UR: 3 /HPF — SIGNIFICANT CHANGE UP (ref 0–5)
GLUCOSE SERPL-MCNC: 93 MG/DL — SIGNIFICANT CHANGE UP (ref 70–99)
GLUCOSE UR QL: NEGATIVE — SIGNIFICANT CHANGE UP
HCG SERPL-ACNC: HIGH MIU/ML
HCT VFR BLD CALC: 40.6 % — SIGNIFICANT CHANGE UP (ref 37–47)
HGB BLD-MCNC: 12.9 G/DL — SIGNIFICANT CHANGE UP (ref 12–16)
HYALINE CASTS # UR AUTO: 1 /LPF — SIGNIFICANT CHANGE UP (ref 0–7)
IMM GRANULOCYTES NFR BLD AUTO: 0.3 % — SIGNIFICANT CHANGE UP (ref 0.1–0.3)
KETONES UR-MCNC: NEGATIVE — SIGNIFICANT CHANGE UP
LEUKOCYTE ESTERASE UR-ACNC: NEGATIVE — SIGNIFICANT CHANGE UP
LYMPHOCYTES # BLD AUTO: 1.49 K/UL — SIGNIFICANT CHANGE UP (ref 1.2–3.4)
LYMPHOCYTES # BLD AUTO: 16.3 % — LOW (ref 20.5–51.1)
MCHC RBC-ENTMCNC: 27.5 PG — SIGNIFICANT CHANGE UP (ref 27–31)
MCHC RBC-ENTMCNC: 31.8 G/DL — LOW (ref 32–37)
MCV RBC AUTO: 86.6 FL — SIGNIFICANT CHANGE UP (ref 81–99)
MONOCYTES # BLD AUTO: 0.46 K/UL — SIGNIFICANT CHANGE UP (ref 0.1–0.6)
MONOCYTES NFR BLD AUTO: 5 % — SIGNIFICANT CHANGE UP (ref 1.7–9.3)
NEUTROPHILS # BLD AUTO: 6.97 K/UL — HIGH (ref 1.4–6.5)
NEUTROPHILS NFR BLD AUTO: 76.6 % — HIGH (ref 42.2–75.2)
NITRITE UR-MCNC: NEGATIVE — SIGNIFICANT CHANGE UP
NRBC # BLD: 0 /100 WBCS — SIGNIFICANT CHANGE UP (ref 0–0)
PH UR: 6 — SIGNIFICANT CHANGE UP (ref 5–8)
PLATELET # BLD AUTO: 245 K/UL — SIGNIFICANT CHANGE UP (ref 130–400)
POTASSIUM SERPL-MCNC: 5.2 MMOL/L — HIGH (ref 3.5–5)
POTASSIUM SERPL-SCNC: 5.2 MMOL/L — HIGH (ref 3.5–5)
PROT SERPL-MCNC: 7.8 G/DL — SIGNIFICANT CHANGE UP (ref 6–8)
PROT UR-MCNC: NEGATIVE — SIGNIFICANT CHANGE UP
RBC # BLD: 4.69 M/UL — SIGNIFICANT CHANGE UP (ref 4.2–5.4)
RBC # FLD: 12.1 % — SIGNIFICANT CHANGE UP (ref 11.5–14.5)
RBC CASTS # UR COMP ASSIST: 3 /HPF — SIGNIFICANT CHANGE UP (ref 0–4)
SODIUM SERPL-SCNC: 134 MMOL/L — LOW (ref 135–146)
SP GR SPEC: 1.02 — SIGNIFICANT CHANGE UP (ref 1.01–1.02)
UROBILINOGEN FLD QL: SIGNIFICANT CHANGE UP
WBC # BLD: 9.12 K/UL — SIGNIFICANT CHANGE UP (ref 4.8–10.8)
WBC # FLD AUTO: 9.12 K/UL — SIGNIFICANT CHANGE UP (ref 4.8–10.8)
WBC UR QL: 1 /HPF — SIGNIFICANT CHANGE UP (ref 0–5)

## 2019-10-17 PROCEDURE — 99284 EMERGENCY DEPT VISIT MOD MDM: CPT

## 2019-10-17 PROCEDURE — 76830 TRANSVAGINAL US NON-OB: CPT | Mod: 26

## 2019-10-17 RX ORDER — ACETAMINOPHEN 500 MG
650 TABLET ORAL ONCE
Refills: 0 | Status: COMPLETED | OUTPATIENT
Start: 2019-10-17 | End: 2019-10-17

## 2019-10-17 RX ORDER — DOXYLAMINE SUCCINATE AND PYRIDOXINE HYDROCHLORIDE, DELAYED RELEASE TABLETS 10 MG/10 MG 10; 10 MG/1; MG/1
2 TABLET, DELAYED RELEASE ORAL
Qty: 60 | Refills: 0
Start: 2019-10-17 | End: 2019-11-15

## 2019-10-17 RX ORDER — ONDANSETRON 8 MG/1
4 TABLET, FILM COATED ORAL ONCE
Refills: 0 | Status: COMPLETED | OUTPATIENT
Start: 2019-10-17 | End: 2019-10-17

## 2019-10-17 RX ORDER — SODIUM CHLORIDE 9 MG/ML
1000 INJECTION INTRAMUSCULAR; INTRAVENOUS; SUBCUTANEOUS ONCE
Refills: 0 | Status: COMPLETED | OUTPATIENT
Start: 2019-10-17 | End: 2019-10-17

## 2019-10-17 RX ADMIN — Medication 650 MILLIGRAM(S): at 20:46

## 2019-10-17 RX ADMIN — SODIUM CHLORIDE 2000 MILLILITER(S): 9 INJECTION INTRAMUSCULAR; INTRAVENOUS; SUBCUTANEOUS at 17:30

## 2019-10-17 RX ADMIN — ONDANSETRON 4 MILLIGRAM(S): 8 TABLET, FILM COATED ORAL at 22:46

## 2019-10-17 NOTE — ED PROVIDER NOTE - PATIENT PORTAL LINK FT
You can access the FollowMyHealth Patient Portal offered by [facility] by registering at the following website: [email or web address]. By joining [email or web address] FollowMyHealth portal, you will also be able to view your health information using other applications (apps) compatible with our system.

## 2019-10-17 NOTE — ED PROVIDER NOTE - NS ED ROS FT
Constitutional: (-) fever (-) vomiting  Eyes/ENT: (-) vision changes  Cardiovascular: (-) chest pain, (-) sob  Respiratory: (-) cough, (-) shortness of breath  Gastrointestinal: (+) abdominal pain  : (-) dysuria   Musculoskeletal: (-) back pain  Integumentary: (-) rash, (-) edema  Neurological: (-)loc  Allergic/Immunologic: (-) pruritus  Endocrine: No history of thyroid disease or diabetes.

## 2019-10-17 NOTE — ED PROVIDER NOTE - OBJECTIVE STATEMENT
33yo F 7 weeks pregnant no sig pmhx presents CC vaginal bleeding and abdominal cramping pain lower abdomen area. pt states she was seen here a few days ago for similar symptoms, dc to follow up w dr stovall, has appointment pending for next week, however today she began passing clots. no loc.

## 2019-10-17 NOTE — ED PROVIDER NOTE - ATTENDING CONTRIBUTION TO CARE
35 yo f who is 7 weeks pregnant presents with vaginal bleeding.  pt with mild bleeding/spotting for 5 days.  pt was seen here recently.  no fevers, no chilsl, no cp, no sob, no urinary complaints.    awake, alert.  neck supple.  abd soft with mild suprapubic tenderness, no rebound, no guarding.  NO RLQ tenderness.  pt with + pelvic cramping.   pt reports passing a clot earlier today.  p: labs, sono, pelvic exam, reassess.

## 2019-10-17 NOTE — ED ADULT NURSE NOTE - OBJECTIVE STATEMENT
pt is 7 weeks pregnant, presents with lower abdominal and vaginal pain started today with vaginal bleeding x 2 days worsen today . pt reports passed a large size blood clot today. pt states was seen Sunday and was told pregnancy stable after blood work and US and was discharged home. pt has an appointment with her OB/GYN Dr. Bucio on 10/24. pt denies urinary symptoms.

## 2019-10-17 NOTE — ED PROVIDER NOTE - PROGRESS NOTE DETAILS
obgyn paged spoke to Suma from OBGYN around 8pm, will come evaluate the patient pt cleared by gyn.  pt comfortable with plan.  pt is already taking prenatal vitamins.  gyn sent in medications rx to pharmacy for nausea./vomiting.  pt tolerating po.

## 2019-10-17 NOTE — ED PROVIDER NOTE - CARE PROVIDER_API CALL
Onofre Bucio)  Obstetrics and Gynecology  [address] [place] [address]  Phone: [phone]  Fax: [phone]  Follow Up Time: 1-3 Days

## 2019-10-17 NOTE — ED PROVIDER NOTE - CLINICAL SUMMARY MEDICAL DECISION MAKING FREE TEXT BOX
pt here with vaginal bleeding.   pt is 7 weeks pregnant.  Pt with threatened .  pt seen by gyn who cleared pt for dc.    Pt given follow up and return precautions by gyn.  pt wants to go home.  pt to follow up with her gyn.  pt already taking prenatal vitamins.  GYN resident sent rx to pharmacy for pt for nausea/vomiting.  pt is able to tolerate PO.  labs reviewed.  sono reveals IUP with fetal heart rate.  RH positive.  .  PLease refer to gyn consult note.  abd soft, benign.   pt is comfortable with dc plan.

## 2019-10-17 NOTE — ED ADULT NURSE NOTE - NSIMPLEMENTINTERV_GEN_ALL_ED
Implemented All Universal Safety Interventions:  [place] to call system. Call bell, personal items and telephone within reach. Instruct patient to call for assistance. Room bathroom lighting operational. Non-slip footwear when patient is off stretcher. Physically safe environment: no spills, clutter or unnecessary equipment. Stretcher in lowest position, wheels locked, appropriate side rails in place.

## 2019-10-17 NOTE — CONSULT NOTE ADULT - SUBJECTIVE AND OBJECTIVE BOX
CHARAN JACQUES   34y   Female   [id number]    Chief Complaint: Vaginal bleeding in first trimester pregnancy     HPI: 35 yo  at around 7w GA by approximate LMP and TVUS here for vaginal bleeding and right sided abdominal pain. Pt reports that she started bleeding on , 10/13/2019. It was always small amount, pinkish to brownish in color. But pt got worried and came to the ED and sonogram showed an IUP with pos FH. Then she scheduled her first prenatal appointment with Dr. Bucio for 10/24/2019. In the meantime she continued bleeding, today her bleeding increased and she passed a nickel sized clot. And she had mild right sided abdominal pain since she found out she was pregnant that have gotten worse in the past two days. Pt now describes the pain as 6/10 intensity, sharp in quality, comes with movement. Pt also reports being nauseous all day long, vomiting 3-4 times a day. Denies fever/chills, diarrhea, SOB, chest pain, palpitations, dizziness, headache, sore throat, runny nose, cough, dysuria, frequency, urgency, sick contacts and recent travel. Unplanned but desired pregnancy.     MEDICATIONS:  None    ALLERGIES:  No Known Drug Allergies    PAST MEDICAL & SURGICAL HISTORY:  No pertinent medical history   H/o:  x2  H/o: appendectomy  H/o: left sided breast cyst removal - benign    OB/GYN HISTORY:      Gyn: denies history of abnormal pap, STI, ovarian cysts, or uterine fibroids  Obstetric: ; FT c/s for breech x1, no complications; FT repeat c/s x1, no complications      FAMILY HISTORY:  Asthma (father)    SOCIAL HISTORY:   Denies cigarette use, alcohol use, or illicit drug use    REVIEW OF SYSTEMS:  Neg unless otherwise indicated in the HPI    Vital Signs Last 24 Hrs  T(C): 36.9 (17 Oct 2019 16:35), Max: 36.9 (17 Oct 2019 16:35)  T(F): 98.4 (17 Oct 2019 16:35), Max: 98.4 (17 Oct 2019 16:35)  HR: 87 (17 Oct 2019 16:35) (87 - 87)  BP: 121/77 (17 Oct 2019 16:35) (121/77 - 121/77)  RR: 16 (17 Oct 2019 16:35) (16 - 16)  SpO2: 100% (17 Oct 2019 16:35) (100% - 100%)    Physical Exam:  Constitutional: AAOx3, NAD  Respiratory: CTAB  Cardiovascular: NL S1/S2  Gastrointestinal: Soft, nontender, nondistended, no rebound, guarding, or rigidity  Pelvic: Normal vulva, normal vagina, no bleeding, speculum inserted, cervix normal, closed, no active bleeding, less than 1cc of bright red blood, no abnormal discharge, uterus anteverted, 7w-sized, no uterine tenderness, no adnexal masses or tenderness    LABS:                        12.9   9.12  )-----------( 245      ( 17 Oct 2019 17:30 )             40.6     HCG Quantitative, Serum: 74136.0 mIU/mL (10-17-19 @ 18:30)  HCG Quantitative, Serum: 48396.0 mIU/mL (10-13-19 @ 16:55)    Type & Screen: AB POS, no antibodies detected    10-17    134<L>  |  100  |  13  ----------------------------<  93  5.2<H>   |  20  |  0.6<L>    Ca    9.7      17 Oct 2019 17:30    TPro  7.8  /  Alb  4.4  /  TBili  <0.2  /  DBili  x   /  AST  31  /  ALT  11  /  AlkPhos  88  10-17    Culture - Urine (collected 10-13-19 @ 17:51)  Source: .Urine Clean Catch (Midstream)  Final Report (10-14-19 @ 17:26):    <10,000 CFU/mL Normal Urogenital Rica    RADIOLOGY & ADDITIONAL STUDIES:    TVUS (10/17/2019):  FINDINGS:   UTERUS: Gravid uterus measuring 10.6 x 7.1 x 5.0 cm. Crown-rump length measuring 1.2 cm. Fetal cardiac activity is detected. Cervical nabothian cysts. RIGHT OVARY: measures 5.5 x 3.6 x 2.5 cm, with 2.2 cm corpus luteal cyst and 3.0 cm simple cyst. Doppler flow is demonstrated to the right ovary. LEFT OVARY: measures 2. 2 x 2 by 1 x 1.4 cm, and is unremarkable. Doppler flow is demonstrated to the left ovary. OTHER: No free fluid in the pelvis.   IMPRESSION: Gravid uterus, fetal age estimated at 7 weeks with crown rump length 1.2 cm. Mildly enlarged right ovary measuring 5.5 cm, with 2.2 cm corpus luteal cyst.     TVUS (10/13/2019):  FINDINGS:    UTERUS: Anteverted measuring 9.7 x 6.0 x 5.2. Intrauterine pregnancy with crown-rump length of 8 mm, which corresponds to a gestational age of six weeks and 5 days. Fetal heart rate of 131 bpm. Yolk sac visualized. Small subchorionic hematoma, up to 1.8 cm, covering less than 20% gestational sac. RIGHT OVARY: measures 4.3 x 2.4 x 2.1 cm and contains a 2.1 cm corpus luteal cyst. Additional 3.1 cm right ovarian simple cyst. Doppler flow is demonstrated to the right ovary. LEFT OVARY: measures 2.4 x 1.6 x 0.9 cm, and is unremarkable. Doppler flow is demonstrated to the left ovary. OTHER: No free fluid in the pelvis.   IMPRESSION:   Single live intrauterine pregnancy with estimated gestational age of 6 weeks and 5 days. Fetal heart rate demonstrated at 131 bpm. Small subchorionic hematoma, up to 1.8 cm, covering less than 20% gestational sac. Right ovarian 2.1 cm ovarian corpus luteal cyst. CHARAN JACQUES   34y   Female   [id number]    Chief Complaint: Vaginal bleeding in first trimester pregnancy     HPI: 33 yo  at around 7w GA by approximate LMP and TVUS here for vaginal bleeding and right sided abdominal pain. Pt reports that she started bleeding on , 10/13/2019. It was always very small in amount, pinkish to brownish in color. But pt got worried and came to the ED and sonogram showed an IUP with pos FH. Then she scheduled her first prenatal appointment with Dr. Bucio for 10/24/2019. In the meantime she continued bleeding, today her bleeding increased, still not soaking a pad, and she passed a nickel sized clot. And she had mild right sided abdominal pain since she found out she was pregnant. Pain has gotten worse in the past two days, still not unbearable. Pt describes the pain as sharp in quality, comes with movement. Pt also reports being nauseous all day long, vomiting 3-4 times a day. Denies fever/chills, diarrhea, SOB, chest pain, palpitations, dizziness, headache, sore throat, runny nose, cough, dysuria, frequency, urgency, sick contacts and recent travel. Unplanned but desired pregnancy.     MEDICATIONS:  None    ALLERGIES:  No Known Drug Allergies    PAST MEDICAL & SURGICAL HISTORY:  No pertinent medical history   H/o:  x2  H/o: appendectomy  H/o: left sided breast cyst removal - benign    OB/GYN HISTORY:      Gyn: denies history of abnormal pap, STI, ovarian cysts, or uterine fibroids  Obstetric: ; FT c/s for breech x1, no complications; FT repeat c/s x1, no complications      FAMILY HISTORY:  Asthma (father)    SOCIAL HISTORY:   Denies cigarette use, alcohol use, or illicit drug use    REVIEW OF SYSTEMS:  Neg unless otherwise indicated in the HPI    Vital Signs Last 24 Hrs  T(C): 36.9 (17 Oct 2019 16:35), Max: 36.9 (17 Oct 2019 16:35)  T(F): 98.4 (17 Oct 2019 16:35), Max: 98.4 (17 Oct 2019 16:35)  HR: 87 (17 Oct 2019 16:35) (87 - 87)  BP: 121/77 (17 Oct 2019 16:35) (121/77 - 121/77)  RR: 16 (17 Oct 2019 16:35) (16 - 16)  SpO2: 100% (17 Oct 2019 16:35) (100% - 100%)    Physical Exam:  Constitutional: AAOx3, NAD  Respiratory: CTAB  Cardiovascular: NL S1/S2  Gastrointestinal: Soft, nontender, nondistended, no rebound, guarding, or rigidity  Pelvic: Normal vulva, normal vagina, no bleeding, speculum inserted, cervix normal, closed, no active bleeding, less than 1cc of bright red blood, no abnormal discharge, uterus anteverted, 7w-sized, no uterine tenderness, no adnexal masses or tenderness    LABS:                        12.9   9.12  )-----------( 245      ( 17 Oct 2019 17:30 )             40.6     HCG Quantitative, Serum: 47101.0 mIU/mL (10-17-19 @ 18:30)  HCG Quantitative, Serum: 88720.0 mIU/mL (10-13-19 @ 16:55)    Type & Screen: AB POS, no antibodies detected    10-17    134<L>  |  100  |  13  ----------------------------<  93  5.2<H>   |  20  |  0.6<L>    Ca    9.7      17 Oct 2019 17:30    TPro  7.8  /  Alb  4.4  /  TBili  <0.2  /  DBili  x   /  AST  31  /  ALT  11  /  AlkPhos  88  10-17    Culture - Urine (collected 10-13-19 @ 17:51)  Source: .Urine Clean Catch (Midstream)  Final Report (10-14-19 @ 17:26):    <10,000 CFU/mL Normal Urogenital Rica    RADIOLOGY & ADDITIONAL STUDIES:    TVUS (10/17/2019):  FINDINGS:   UTERUS: Gravid uterus measuring 10.6 x 7.1 x 5.0 cm. Crown-rump length measuring 1.2 cm. Fetal cardiac activity is detected. Cervical nabothian cysts. RIGHT OVARY: measures 5.5 x 3.6 x 2.5 cm, with 2.2 cm corpus luteal cyst and 3.0 cm simple cyst. Doppler flow is demonstrated to the right ovary. LEFT OVARY: measures 2. 2 x 2 by 1 x 1.4 cm, and is unremarkable. Doppler flow is demonstrated to the left ovary. OTHER: No free fluid in the pelvis.   IMPRESSION: Gravid uterus, fetal age estimated at 7 weeks with crown rump length 1.2 cm. Mildly enlarged right ovary measuring 5.5 cm, with 2.2 cm corpus luteal cyst.     TVUS (10/13/2019):  FINDINGS:    UTERUS: Anteverted measuring 9.7 x 6.0 x 5.2. Intrauterine pregnancy with crown-rump length of 8 mm, which corresponds to a gestational age of six weeks and 5 days. Fetal heart rate of 131 bpm. Yolk sac visualized. Small subchorionic hematoma, up to 1.8 cm, covering less than 20% gestational sac. RIGHT OVARY: measures 4.3 x 2.4 x 2.1 cm and contains a 2.1 cm corpus luteal cyst. Additional 3.1 cm right ovarian simple cyst. Doppler flow is demonstrated to the right ovary. LEFT OVARY: measures 2.4 x 1.6 x 0.9 cm, and is unremarkable. Doppler flow is demonstrated to the left ovary. OTHER: No free fluid in the pelvis.   IMPRESSION:   Single live intrauterine pregnancy with estimated gestational age of 6 weeks and 5 days. Fetal heart rate demonstrated at 131 bpm. Small subchorionic hematoma, up to 1.8 cm, covering less than 20% gestational sac. Right ovarian 2.1 cm ovarian corpus luteal cyst.

## 2019-10-17 NOTE — ED ADULT TRIAGE NOTE - CHIEF COMPLAINT QUOTE
vaginal bleeding x 3 days. pt recently seen on sunday for vaginal bleeding. pt experienced more bleeding today. pt is 7 weeks pregnant

## 2019-10-17 NOTE — CONSULT NOTE ADULT - ASSESSMENT
33 yo  at around 7w GA by approximate LMP and TVUS w/ mild vaginal bleeding in 1st trimester pregnancy, w/ N/V of pregnancy, hemodynamically and clinically stable, no acute OB/GYN interventions needed,     -Doxylamine-pyridoxine for N/V of pregnancy  -Bleeding/pain/infection precautions given  -F/u outpt with PMD as scheduled  -Disposition of pt per ED team     Dr. Peng and Dr. Bucio to be made aware. 35 yo  at around 7w GA by approximate LMP and TVUS w/ mild vaginal bleeding in 1st trimester pregnancy, most likely threatened , w/ N/V of pregnancy, hemodynamically and clinically stable, no acute OB/GYN interventions needed,     -Zofran now for N/V  -Doxylamine-pyridoxine for N/V of pregnancy to manage it outpt  -Bleeding/pain/infection precautions given  -F/u outpt with PMD as scheduled  -Disposition of pt per ED team     Dr. Peng and Dr. Bucio aware.

## 2019-10-27 ENCOUNTER — EMERGENCY (EMERGENCY)
Facility: HOSPITAL | Age: 34
LOS: 0 days | Discharge: HOME | End: 2019-10-27
Attending: EMERGENCY MEDICINE | Admitting: EMERGENCY MEDICINE
Payer: MEDICAID

## 2019-10-27 VITALS
OXYGEN SATURATION: 98 % | RESPIRATION RATE: 16 BRPM | WEIGHT: 142.86 LBS | SYSTOLIC BLOOD PRESSURE: 132 MMHG | TEMPERATURE: 98 F | DIASTOLIC BLOOD PRESSURE: 82 MMHG | HEART RATE: 90 BPM

## 2019-10-27 VITALS
RESPIRATION RATE: 17 BRPM | DIASTOLIC BLOOD PRESSURE: 64 MMHG | OXYGEN SATURATION: 98 % | SYSTOLIC BLOOD PRESSURE: 107 MMHG | HEART RATE: 87 BPM

## 2019-10-27 DIAGNOSIS — R11.0 NAUSEA: ICD-10-CM

## 2019-10-27 DIAGNOSIS — Z3A.08 8 WEEKS GESTATION OF PREGNANCY: ICD-10-CM

## 2019-10-27 DIAGNOSIS — Z98.891 HISTORY OF UTERINE SCAR FROM PREVIOUS SURGERY: Chronic | ICD-10-CM

## 2019-10-27 DIAGNOSIS — O99.89 OTHER SPECIFIED DISEASES AND CONDITIONS COMPLICATING PREGNANCY, CHILDBIRTH AND THE PUERPERIUM: ICD-10-CM

## 2019-10-27 DIAGNOSIS — R11.10 VOMITING, UNSPECIFIED: ICD-10-CM

## 2019-10-27 DIAGNOSIS — O21.0 MILD HYPEREMESIS GRAVIDARUM: ICD-10-CM

## 2019-10-27 DIAGNOSIS — Z87.59 PERSONAL HISTORY OF OTHER COMPLICATIONS OF PREGNANCY, CHILDBIRTH AND THE PUERPERIUM: ICD-10-CM

## 2019-10-27 LAB
ALBUMIN SERPL ELPH-MCNC: 4.5 G/DL — SIGNIFICANT CHANGE UP (ref 3.5–5.2)
ALP SERPL-CCNC: 91 U/L — SIGNIFICANT CHANGE UP (ref 30–115)
ALT FLD-CCNC: 12 U/L — SIGNIFICANT CHANGE UP (ref 0–41)
ANION GAP SERPL CALC-SCNC: 14 MMOL/L — SIGNIFICANT CHANGE UP (ref 7–14)
APPEARANCE UR: CLEAR — SIGNIFICANT CHANGE UP
APPEARANCE UR: CLEAR — SIGNIFICANT CHANGE UP
AST SERPL-CCNC: 23 U/L — SIGNIFICANT CHANGE UP (ref 0–41)
B-OH-BUTYR SERPL-SCNC: <0.2 MMOL/L — SIGNIFICANT CHANGE UP
BACTERIA # UR AUTO: ABNORMAL
BACTERIA # UR AUTO: NEGATIVE — SIGNIFICANT CHANGE UP
BASOPHILS # BLD AUTO: 0.03 K/UL — SIGNIFICANT CHANGE UP (ref 0–0.2)
BASOPHILS NFR BLD AUTO: 0.4 % — SIGNIFICANT CHANGE UP (ref 0–1)
BILIRUB SERPL-MCNC: 0.2 MG/DL — SIGNIFICANT CHANGE UP (ref 0.2–1.2)
BILIRUB UR-MCNC: NEGATIVE — SIGNIFICANT CHANGE UP
BILIRUB UR-MCNC: NEGATIVE — SIGNIFICANT CHANGE UP
BUN SERPL-MCNC: 9 MG/DL — LOW (ref 10–20)
CALCIUM SERPL-MCNC: 9.7 MG/DL — SIGNIFICANT CHANGE UP (ref 8.5–10.1)
CHLORIDE SERPL-SCNC: 102 MMOL/L — SIGNIFICANT CHANGE UP (ref 98–110)
CO2 SERPL-SCNC: 23 MMOL/L — SIGNIFICANT CHANGE UP (ref 17–32)
COLOR SPEC: YELLOW — SIGNIFICANT CHANGE UP
COLOR SPEC: YELLOW — SIGNIFICANT CHANGE UP
CREAT SERPL-MCNC: 0.6 MG/DL — LOW (ref 0.7–1.5)
DIFF PNL FLD: ABNORMAL
DIFF PNL FLD: ABNORMAL
EOSINOPHIL # BLD AUTO: 0.07 K/UL — SIGNIFICANT CHANGE UP (ref 0–0.7)
EOSINOPHIL NFR BLD AUTO: 0.9 % — SIGNIFICANT CHANGE UP (ref 0–8)
EPI CELLS # UR: 1 /HPF — SIGNIFICANT CHANGE UP (ref 0–5)
EPI CELLS # UR: 10 /HPF — HIGH (ref 0–5)
GLUCOSE SERPL-MCNC: 90 MG/DL — SIGNIFICANT CHANGE UP (ref 70–99)
GLUCOSE UR QL: NEGATIVE — SIGNIFICANT CHANGE UP
GLUCOSE UR QL: NEGATIVE — SIGNIFICANT CHANGE UP
HCT VFR BLD CALC: 39.2 % — SIGNIFICANT CHANGE UP (ref 37–47)
HGB BLD-MCNC: 12.9 G/DL — SIGNIFICANT CHANGE UP (ref 12–16)
HYALINE CASTS # UR AUTO: 0 /LPF — SIGNIFICANT CHANGE UP (ref 0–7)
HYALINE CASTS # UR AUTO: 5 /LPF — SIGNIFICANT CHANGE UP (ref 0–7)
IMM GRANULOCYTES NFR BLD AUTO: 0.4 % — HIGH (ref 0.1–0.3)
KETONES UR-MCNC: NEGATIVE — SIGNIFICANT CHANGE UP
KETONES UR-MCNC: NEGATIVE — SIGNIFICANT CHANGE UP
LEUKOCYTE ESTERASE UR-ACNC: NEGATIVE — SIGNIFICANT CHANGE UP
LEUKOCYTE ESTERASE UR-ACNC: NEGATIVE — SIGNIFICANT CHANGE UP
LYMPHOCYTES # BLD AUTO: 1.47 K/UL — SIGNIFICANT CHANGE UP (ref 1.2–3.4)
LYMPHOCYTES # BLD AUTO: 19.6 % — LOW (ref 20.5–51.1)
MCHC RBC-ENTMCNC: 28.1 PG — SIGNIFICANT CHANGE UP (ref 27–31)
MCHC RBC-ENTMCNC: 32.9 G/DL — SIGNIFICANT CHANGE UP (ref 32–37)
MCV RBC AUTO: 85.4 FL — SIGNIFICANT CHANGE UP (ref 81–99)
MONOCYTES # BLD AUTO: 0.46 K/UL — SIGNIFICANT CHANGE UP (ref 0.1–0.6)
MONOCYTES NFR BLD AUTO: 6.1 % — SIGNIFICANT CHANGE UP (ref 1.7–9.3)
NEUTROPHILS # BLD AUTO: 5.43 K/UL — SIGNIFICANT CHANGE UP (ref 1.4–6.5)
NEUTROPHILS NFR BLD AUTO: 72.6 % — SIGNIFICANT CHANGE UP (ref 42.2–75.2)
NITRITE UR-MCNC: NEGATIVE — SIGNIFICANT CHANGE UP
NITRITE UR-MCNC: NEGATIVE — SIGNIFICANT CHANGE UP
NRBC # BLD: 0 /100 WBCS — SIGNIFICANT CHANGE UP (ref 0–0)
PH UR: 6 — SIGNIFICANT CHANGE UP (ref 5–8)
PH UR: 6 — SIGNIFICANT CHANGE UP (ref 5–8)
PLATELET # BLD AUTO: 212 K/UL — SIGNIFICANT CHANGE UP (ref 130–400)
POTASSIUM SERPL-MCNC: 4.6 MMOL/L — SIGNIFICANT CHANGE UP (ref 3.5–5)
POTASSIUM SERPL-SCNC: 4.6 MMOL/L — SIGNIFICANT CHANGE UP (ref 3.5–5)
PROT SERPL-MCNC: 7.9 G/DL — SIGNIFICANT CHANGE UP (ref 6–8)
PROT UR-MCNC: SIGNIFICANT CHANGE UP
PROT UR-MCNC: SIGNIFICANT CHANGE UP
RBC # BLD: 4.59 M/UL — SIGNIFICANT CHANGE UP (ref 4.2–5.4)
RBC # FLD: 11.7 % — SIGNIFICANT CHANGE UP (ref 11.5–14.5)
RBC CASTS # UR COMP ASSIST: 2 /HPF — SIGNIFICANT CHANGE UP (ref 0–4)
RBC CASTS # UR COMP ASSIST: 6 /HPF — HIGH (ref 0–4)
SODIUM SERPL-SCNC: 139 MMOL/L — SIGNIFICANT CHANGE UP (ref 135–146)
SP GR SPEC: 1.03 — HIGH (ref 1.01–1.02)
SP GR SPEC: 1.03 — HIGH (ref 1.01–1.02)
UROBILINOGEN FLD QL: ABNORMAL
UROBILINOGEN FLD QL: SIGNIFICANT CHANGE UP
WBC # BLD: 7.49 K/UL — SIGNIFICANT CHANGE UP (ref 4.8–10.8)
WBC # FLD AUTO: 7.49 K/UL — SIGNIFICANT CHANGE UP (ref 4.8–10.8)
WBC UR QL: 0 /HPF — SIGNIFICANT CHANGE UP (ref 0–5)
WBC UR QL: 5 /HPF — SIGNIFICANT CHANGE UP (ref 0–5)

## 2019-10-27 PROCEDURE — 99284 EMERGENCY DEPT VISIT MOD MDM: CPT

## 2019-10-27 RX ORDER — DIPHENHYDRAMINE HCL 50 MG
25 CAPSULE ORAL ONCE
Refills: 0 | Status: COMPLETED | OUTPATIENT
Start: 2019-10-27 | End: 2019-10-27

## 2019-10-27 RX ORDER — METOCLOPRAMIDE HCL 10 MG
10 TABLET ORAL ONCE
Refills: 0 | Status: COMPLETED | OUTPATIENT
Start: 2019-10-27 | End: 2019-10-27

## 2019-10-27 RX ORDER — SODIUM CHLORIDE 9 MG/ML
1000 INJECTION, SOLUTION INTRAVENOUS
Refills: 0 | Status: DISCONTINUED | OUTPATIENT
Start: 2019-10-27 | End: 2019-10-27

## 2019-10-27 RX ORDER — ONDANSETRON 8 MG/1
4 TABLET, FILM COATED ORAL ONCE
Refills: 0 | Status: COMPLETED | OUTPATIENT
Start: 2019-10-27 | End: 2019-10-27

## 2019-10-27 RX ORDER — SODIUM CHLORIDE 9 MG/ML
1000 INJECTION INTRAMUSCULAR; INTRAVENOUS; SUBCUTANEOUS
Refills: 0 | Status: DISCONTINUED | OUTPATIENT
Start: 2019-10-27 | End: 2019-10-27

## 2019-10-27 RX ADMIN — Medication 104 MILLIGRAM(S): at 22:38

## 2019-10-27 RX ADMIN — SODIUM CHLORIDE 150 MILLILITER(S): 9 INJECTION INTRAMUSCULAR; INTRAVENOUS; SUBCUTANEOUS at 22:36

## 2019-10-27 RX ADMIN — SODIUM CHLORIDE 125 MILLILITER(S): 9 INJECTION, SOLUTION INTRAVENOUS at 21:21

## 2019-10-27 RX ADMIN — ONDANSETRON 4 MILLIGRAM(S): 8 TABLET, FILM COATED ORAL at 21:21

## 2019-10-27 RX ADMIN — Medication 25 MILLIGRAM(S): at 22:33

## 2019-10-27 NOTE — ED ADULT NURSE NOTE - OBJECTIVE STATEMENT
patient reports being 8 weeks pregnant and having nausea and vomiting today.  No vomiting noted.  PAtient denies chest pain and no SOB.

## 2019-10-27 NOTE — ED PROVIDER NOTE - NS ED ROS FT
Review of Systems         Constitutional: (-) fever (-) chills (-) weakness       EENT: (-) visual changes       Cardiovascular: (-) chest pain (-) syncope       Respiratory: (-) cough, (-) shortness of breath       Gastrointestinal: (-) abdominal pain (+) vomiting (-) diarrhea (+) nausea (-) constipation       Genitourinary: (-) dysuria (-) frequency (-) hematuria       Musculoskeletal: (-) neck pain (-) back pain (-) joint pain       Integumentary: (-) rash       Neurological: (-) headache (-) altered mental status (-) dizziness (-) paresthesias       Psych: (-) psych history

## 2019-10-27 NOTE — ED PROVIDER NOTE - NSFOLLOWUPCLINICS_GEN_ALL_ED_FT
[facility]  OB/GYN  [address] [place] [address]  Phone: [phone]  Fax:   Follow Up Time: 1-3 Days

## 2019-10-27 NOTE — ED ADULT TRIAGE NOTE - CHIEF COMPLAINT QUOTE
PT is 8 weeks pregnant and started having slight abdominal discomfort with nausea and vomiting today.

## 2019-10-27 NOTE — ED PROVIDER NOTE - NSFOLLOWUPINSTRUCTIONS_ED_ALL_ED_FT
-Follow up with your OB/GYN in 1-3 days  -Return to ED for worsening symptoms or concerns.    Nausea and Vomiting, Adult  Nausea is the feeling that you have an upset stomach or have to vomit. As nausea gets worse, it can lead to vomiting. Vomiting occurs when stomach contents are thrown up and out of the mouth. Vomiting can make you feel weak and cause you to become dehydrated. Dehydration can make you tired and thirsty, cause you to have a dry mouth, and decrease how often you urinate. Older adults and people with other diseases or a weak immune system are at higher risk for dehydration. It is important to treat your nausea and vomiting as told by your health care provider.    Follow these instructions at home:  Follow instructions from your health care provider about how to care for yourself at home.    Eating and drinking     Follow these recommendations as told by your health care provider:    Take an oral rehydration solution (ORS). This is a drink that is sold at pharmacies and retail stores.  Drink clear fluids in small amounts as you are able. Clear fluids include water, ice chips, diluted fruit juice, and low-calorie sports drinks.  Eat bland, easy-to-digest foods in small amounts as you are able. These foods include bananas, applesauce, rice, lean meats, toast, and crackers.  Avoid fluids that contain a lot of sugar or caffeine, such as energy drinks, sports drinks, and soda.  Avoid alcohol.  Avoid spicy or fatty foods.    General instructions     Drink enough fluid to keep your urine clear or pale yellow.  Wash your hands often. If soap and water are not available, use hand .  Make sure that all people in your household wash their hands well and often.  Take over-the-counter and prescription medicines only as told by your health care provider.  Rest at home while you recover.  Watch your condition for any changes.  Breathe slowly and deeply when you feel nauseated.  Keep all follow-up visits as told by your health care provider. This is important.  Contact a health care provider if:  You have a fever.  You cannot keep fluids down.  Your symptoms get worse.  You have new symptoms.  Your nausea does not go away after two days.  You feel light-headed or dizzy.  You have a headache.  You have muscle cramps.  Get help right away if:  You have pain in your chest, neck, arm, or jaw.  You feel extremely weak or you faint.  You have persistent vomiting.  You see blood in your vomit.  Your vomit looks like black coffee grounds.  You have bloody or black stools or stools that look like tar.  You have a severe headache, a stiff neck, or both.  You have a rash.  You have severe pain, cramping, or bloating in your abdomen.  You have trouble breathing or you are breathing very quickly.  Your heart is beating very quickly.  Your skin feels cold and clammy.  You feel confused.  You have pain when you urinate.  You have signs of dehydration, such as:    Dark urine, very little urine, or no urine.  Cracked lips.  Dry mouth.  Sunken eyes.  Sleepiness.  Weakness.    These symptoms may represent a serious problem that is an emergency. Do not wait to see if the symptoms will go away. Get medical help right away. Call your local emergency services (911 in the [place] Do not drive yourself to the hospital.

## 2019-10-27 NOTE — ED PROVIDER NOTE - ATTENDING CONTRIBUTION TO CARE
35 yo f with no pmh, presents with n/v.  pt is approx 8 weeks EGA, pt is .  pt f/u with dr. stovall.  pt already on zofran odt that was given to her rx from previous ED visit.  has been unable to tolerate po x 3 hrs.  denies abd pain despite triage note.  saw dr. stovall 2 days ago, had normal f/u.  US showed IUP with fhr as per pt and .  exam: nad, ncat, perrl, eomi, dry mm, rrr, ctab, abd soft, nt,nd aox3, imp: pt with HG, labs, ivf, antiemetics, reassess po challenge

## 2019-10-27 NOTE — ED ADULT NURSE NOTE - NSIMPLEMENTINTERV_GEN_ALL_ED
Implemented All Universal Safety Interventions:  [place] to call system. Call bell, personal items and telephone within reach. Instruct patient to call for assistance. Room bathroom lighting operational. Non-slip footwear when patient is off stretcher. Physically safe environment: no spills, clutter or unnecessary equipment. Stretcher in lowest position, wheels locked, appropriate side rails in place.

## 2019-10-27 NOTE — ED PROVIDER NOTE - PATIENT PORTAL LINK FT
You can access the FollowMyHealth Patient Portal offered by [facility] by registering at the following website: [email or web address]. By joining [employer]’s FollowMyHealth portal, you will also be able to view your health information using other applications (apps) compatible with our system.

## 2019-10-27 NOTE — ED ADULT NURSE NOTE - CHPI ED NUR SYMPTOMS NEG
no burning urination/no abdominal distension/no dysuria/no fever/no diarrhea/no hematuria/no blood in stool/no chills

## 2019-10-27 NOTE — ED PROVIDER NOTE - OBJECTIVE STATEMENT
34 year old female  presenting with nausea and vomiting x 1 day. patient on Zofran 4 mg ODT however today it did not help when she took it at 6pm. She states she has been vomiting all day (8-9 bouts, NBNB) worse with PO intake, no palliation, unchanged. Patient denies fevers, chills, diarrhea, abd pain, vag discharge, dysuria, hematuria, frequency, urgency, back pain. No complications of pregnancy or past pregnancies. 3 days ago US confirmed IUP at OB doc.

## 2019-10-27 NOTE — ED PROVIDER NOTE - CLINICAL SUMMARY MEDICAL DECISION MAKING FREE TEXT BOX
Patient was signed out to me by Dr. thao pending repeat UA and reevaluation. Patient remained stable in ER, improved well during the course of ER stay. States is feeling lot better, want to go home and f/u as out-patient. Patient is awake, alert, o x 3, ambulatory comfortable, tolerated PO. Discussed with patient in detail about her clinical condition, results of the diagnostic studies and the need for close out-patient follow up. Detail aftercare instructions and return precautions are given.

## 2019-10-27 NOTE — ED PROVIDER NOTE - PROGRESS NOTE DETAILS
Patient no longer having RLQ cramping, nausea resolved. Tolerating PO. repeat abd examine: soft nontender Patient no longer nauseous after reglan, tolerating PO now, in NAD. awaiting second urine sample

## 2019-10-29 LAB
CULTURE RESULTS: SIGNIFICANT CHANGE UP
SPECIMEN SOURCE: SIGNIFICANT CHANGE UP

## 2019-11-19 ENCOUNTER — EMERGENCY (EMERGENCY)
Facility: HOSPITAL | Age: 34
LOS: 0 days | Discharge: HOME | End: 2019-11-20
Attending: EMERGENCY MEDICINE | Admitting: EMERGENCY MEDICINE
Payer: MEDICAID

## 2019-11-19 VITALS
TEMPERATURE: 97 F | DIASTOLIC BLOOD PRESSURE: 59 MMHG | HEART RATE: 105 BPM | SYSTOLIC BLOOD PRESSURE: 119 MMHG | RESPIRATION RATE: 18 BRPM | OXYGEN SATURATION: 98 %

## 2019-11-19 DIAGNOSIS — O21.9 VOMITING OF PREGNANCY, UNSPECIFIED: ICD-10-CM

## 2019-11-19 DIAGNOSIS — R10.2 PELVIC AND PERINEAL PAIN: ICD-10-CM

## 2019-11-19 DIAGNOSIS — Z3A.12 12 WEEKS GESTATION OF PREGNANCY: ICD-10-CM

## 2019-11-19 DIAGNOSIS — Z98.891 HISTORY OF UTERINE SCAR FROM PREVIOUS SURGERY: Chronic | ICD-10-CM

## 2019-11-19 DIAGNOSIS — R11.0 NAUSEA: ICD-10-CM

## 2019-11-19 LAB
ALBUMIN SERPL ELPH-MCNC: 4 G/DL — SIGNIFICANT CHANGE UP (ref 3.5–5.2)
ALP SERPL-CCNC: 72 U/L — SIGNIFICANT CHANGE UP (ref 30–115)
ALT FLD-CCNC: 12 U/L — SIGNIFICANT CHANGE UP (ref 0–41)
ANION GAP SERPL CALC-SCNC: 15 MMOL/L — HIGH (ref 7–14)
APPEARANCE UR: CLEAR — SIGNIFICANT CHANGE UP
AST SERPL-CCNC: 20 U/L — SIGNIFICANT CHANGE UP (ref 0–41)
BACTERIA # UR AUTO: ABNORMAL
BASOPHILS # BLD AUTO: 0.02 K/UL — SIGNIFICANT CHANGE UP (ref 0–0.2)
BASOPHILS NFR BLD AUTO: 0.3 % — SIGNIFICANT CHANGE UP (ref 0–1)
BILIRUB SERPL-MCNC: 0.3 MG/DL — SIGNIFICANT CHANGE UP (ref 0.2–1.2)
BILIRUB UR-MCNC: NEGATIVE — SIGNIFICANT CHANGE UP
BUN SERPL-MCNC: 8 MG/DL — LOW (ref 10–20)
CALCIUM SERPL-MCNC: 9.2 MG/DL — SIGNIFICANT CHANGE UP (ref 8.5–10.1)
CHLORIDE SERPL-SCNC: 101 MMOL/L — SIGNIFICANT CHANGE UP (ref 98–110)
CO2 SERPL-SCNC: 22 MMOL/L — SIGNIFICANT CHANGE UP (ref 17–32)
COLOR SPEC: YELLOW — SIGNIFICANT CHANGE UP
CREAT SERPL-MCNC: 0.5 MG/DL — LOW (ref 0.7–1.5)
DIFF PNL FLD: ABNORMAL
EOSINOPHIL # BLD AUTO: 0.12 K/UL — SIGNIFICANT CHANGE UP (ref 0–0.7)
EOSINOPHIL NFR BLD AUTO: 1.7 % — SIGNIFICANT CHANGE UP (ref 0–8)
EPI CELLS # UR: 11 /HPF — HIGH (ref 0–5)
GLUCOSE SERPL-MCNC: 91 MG/DL — SIGNIFICANT CHANGE UP (ref 70–99)
GLUCOSE UR QL: NEGATIVE — SIGNIFICANT CHANGE UP
HCT VFR BLD CALC: 36.1 % — LOW (ref 37–47)
HGB BLD-MCNC: 11.8 G/DL — LOW (ref 12–16)
HYALINE CASTS # UR AUTO: 4 /LPF — SIGNIFICANT CHANGE UP (ref 0–7)
IMM GRANULOCYTES NFR BLD AUTO: 0.1 % — SIGNIFICANT CHANGE UP (ref 0.1–0.3)
KETONES UR-MCNC: NEGATIVE — SIGNIFICANT CHANGE UP
LEUKOCYTE ESTERASE UR-ACNC: NEGATIVE — SIGNIFICANT CHANGE UP
LYMPHOCYTES # BLD AUTO: 1.58 K/UL — SIGNIFICANT CHANGE UP (ref 1.2–3.4)
LYMPHOCYTES # BLD AUTO: 23 % — SIGNIFICANT CHANGE UP (ref 20.5–51.1)
MCHC RBC-ENTMCNC: 27.6 PG — SIGNIFICANT CHANGE UP (ref 27–31)
MCHC RBC-ENTMCNC: 32.7 G/DL — SIGNIFICANT CHANGE UP (ref 32–37)
MCV RBC AUTO: 84.3 FL — SIGNIFICANT CHANGE UP (ref 81–99)
MONOCYTES # BLD AUTO: 0.31 K/UL — SIGNIFICANT CHANGE UP (ref 0.1–0.6)
MONOCYTES NFR BLD AUTO: 4.5 % — SIGNIFICANT CHANGE UP (ref 1.7–9.3)
NEUTROPHILS # BLD AUTO: 4.82 K/UL — SIGNIFICANT CHANGE UP (ref 1.4–6.5)
NEUTROPHILS NFR BLD AUTO: 70.4 % — SIGNIFICANT CHANGE UP (ref 42.2–75.2)
NITRITE UR-MCNC: NEGATIVE — SIGNIFICANT CHANGE UP
NRBC # BLD: 0 /100 WBCS — SIGNIFICANT CHANGE UP (ref 0–0)
PH UR: 6 — SIGNIFICANT CHANGE UP (ref 5–8)
PLATELET # BLD AUTO: 219 K/UL — SIGNIFICANT CHANGE UP (ref 130–400)
POTASSIUM SERPL-MCNC: 4.4 MMOL/L — SIGNIFICANT CHANGE UP (ref 3.5–5)
POTASSIUM SERPL-SCNC: 4.4 MMOL/L — SIGNIFICANT CHANGE UP (ref 3.5–5)
PROT SERPL-MCNC: 7.1 G/DL — SIGNIFICANT CHANGE UP (ref 6–8)
PROT UR-MCNC: ABNORMAL
RBC # BLD: 4.28 M/UL — SIGNIFICANT CHANGE UP (ref 4.2–5.4)
RBC # FLD: 11.9 % — SIGNIFICANT CHANGE UP (ref 11.5–14.5)
RBC CASTS # UR COMP ASSIST: 4 /HPF — SIGNIFICANT CHANGE UP (ref 0–4)
SODIUM SERPL-SCNC: 138 MMOL/L — SIGNIFICANT CHANGE UP (ref 135–146)
SP GR SPEC: 1.03 — HIGH (ref 1.01–1.02)
UROBILINOGEN FLD QL: ABNORMAL
WBC # BLD: 6.86 K/UL — SIGNIFICANT CHANGE UP (ref 4.8–10.8)
WBC # FLD AUTO: 6.86 K/UL — SIGNIFICANT CHANGE UP (ref 4.8–10.8)
WBC UR QL: 5 /HPF — SIGNIFICANT CHANGE UP (ref 0–5)

## 2019-11-19 PROCEDURE — 99284 EMERGENCY DEPT VISIT MOD MDM: CPT

## 2019-11-19 RX ORDER — SODIUM CHLORIDE 9 MG/ML
2000 INJECTION, SOLUTION INTRAVENOUS ONCE
Refills: 0 | Status: DISCONTINUED | OUTPATIENT
Start: 2019-11-19 | End: 2019-11-19

## 2019-11-19 RX ORDER — ONDANSETRON 8 MG/1
4 TABLET, FILM COATED ORAL ONCE
Refills: 0 | Status: DISCONTINUED | OUTPATIENT
Start: 2019-11-19 | End: 2019-11-19

## 2019-11-19 RX ORDER — ONDANSETRON 8 MG/1
4 TABLET, FILM COATED ORAL ONCE
Refills: 0 | Status: COMPLETED | OUTPATIENT
Start: 2019-11-19 | End: 2019-11-19

## 2019-11-19 NOTE — ED PROVIDER NOTE - ATTENDING CONTRIBUTION TO CARE
33 yo  currently 13 weeks pregnant by dates with PMH of ovarian cyst presents to ER  c/o nausea/vomiting and abdominal pain off/on throughout her 1st trimester. Returned to ED tonight because she had felt more nausea and had abdominal pain. She admitted she hasn't been taking Diclegis which was prescribed by her OB for the nausea. She denies vaginal bleeding and discharge. She denies dysuria/urgency/frequency/flank pain/fever/chill/HA/dizziness/chest pain/palpitation/sob/diarrhea/ black stool/bloody stool. On exam pt is gravid, to above pubic area but below umbilicus, and is non tender on exam. Will check labs,give IVF's and antiemetics, check US pelvis, and reassess.

## 2019-11-19 NOTE — ED PROVIDER NOTE - CLINICAL SUMMARY MEDICAL DECISION MAKING FREE TEXT BOX
35 yo female presents to the ER for N/V and pelvic pain for nearly her entire pregnancy but tonight came in because she felt worse. She was given IVFs, and antiemetics. Pt had labs and US done and results reviewed. Placed on compazine VT , and ODT Zofran for break through nausea on a prn basis (after discussion with on call OB/GYN). Pt felt better and instructed to follow up with her OB in the next few days.

## 2019-11-19 NOTE — ED PROVIDER NOTE - NS ED ROS FT
Constitutional: no fever, chills, no recent weight loss, change in appetite or malaise  Eyes: no redness/discharge/pain/vision changes  ENT: no rhinorrhea/ear pain/sore throat  Cardiac: No chest pain, SOB or edema.  Respiratory: No cough or respiratory distress  GI: see HPI  : see HPI. No dysuria, frequency, urgency or hematuria  MS: no pain to back or extremities, no loss of ROM, no weakness  Neuro: No headache or weakness. No LOC.  Skin: No skin rash.  Endocrine: No history of thyroid disease or diabetes.

## 2019-11-19 NOTE — ED PROVIDER NOTE - OBJECTIVE STATEMENT
33 yo  13 weeks pregnant female hx of ovarian cyst present c/o nausea/vomiting and abdominal pain off/on throughout her 1st trimester. returned to ED tonight because she had felt more nausea and had abdominal pain. admitted she hasn't been taking Diclegis denies vaginal bleeding and discharge. denies dysuria/urgency/frequency. denies flank pain. Denies fever/chill/HA/dizziness/chest pain/palpitation/sob/diarrhea/ black stool/bloody stool

## 2019-11-19 NOTE — ED PROVIDER NOTE - PROGRESS NOTE DETAILS
blood type noted to be AB+ and hcg 94K from recent prev visit spoke with GYN resident Lacie, recommend zofran ODT PRN and compazine FL QHS patient refused further IV attempts. took zofran ODT with relief. tolerated PO prior to dc

## 2019-11-19 NOTE — ED ADULT NURSE NOTE - OBJECTIVE STATEMENT
Patient is a/o x4 and is 12 weeks pregnant. Patient has c/o abdominal pain and nausea x2 days. No vaginal bleeding noted, or distress. Will continue to monitor.

## 2019-11-19 NOTE — ED ADULT NURSE REASSESSMENT NOTE - NS ED NURSE REASSESS COMMENT FT1
patient refused IV and IVF. OMARI tenorio made aware, new orders for PO zofran will be administered. No distress noted at this time, patient is asking to eat. Will PO trial.

## 2019-11-19 NOTE — ED PROVIDER NOTE - PATIENT PORTAL LINK FT
You can access the FollowMyHealth Patient Portal offered by [facility] by registering at the following website: [email or web address]. By joining [facility]’s FollowMyHealth portal, you will also be able to view your health information using other applications (apps) compatible with our system.

## 2019-11-19 NOTE — ED PROVIDER NOTE - CARE PLAN
Principal Discharge DX:	Nausea and vomiting during pregnancy  Secondary Diagnosis:	Pelvic pain during pregnancy

## 2019-11-19 NOTE — ED PROVIDER NOTE - PHYSICAL EXAMINATION
CONSTITUTIONAL: Well-appearing; well-nourished; in no apparent distress.   EYES: PERRL; EOM intact.   ENT: moist oral mucosal. normal nose; no rhinorrhea; normal pharynx with no tonsillar hypertrophy.   CARDIOVASCULAR: Normal S1, S2; no murmurs, rubs, or gallops.   RESPIRATORY: Normal chest excursion with respiration; breath sounds clear and equal bilaterally; no wheezes, rhonchi, or rales.  GI/: +  abdomen. fundal height up to umbilicus cw her gestation stage. Normal bowel sounds; non-distended; non-tender; no CVAT  MS: No evidence of trauma or deformity.   SKIN: Normal for age and race; warm; dry; good turgor; no apparent lesions or exudate.   NEURO/PSYCH: A & O x 4; grossly unremarkable. CONSTITUTIONAL: Well-appearing; well-nourished; in no apparent distress.   EYES: PERRL; EOM intact.   ENT: moist oral mucosal. normal nose; no rhinorrhea; normal pharynx with no tonsillar hypertrophy.   CARDIOVASCULAR: Normal S1, S2; no murmurs, rubs, or gallops.   RESPIRATORY: Normal chest excursion with respiration; breath sounds clear and equal bilaterally; no wheezes, rhonchi, or rales.  GI/: +  abdomen. fundal height bw suprapubic area and umbilicus cw her gestation stage. Normal bowel sounds; non-distended; non-tender; no CVAT  MS: No evidence of trauma or deformity.   SKIN: Normal for age and race; warm; dry; good turgor; no apparent lesions or exudate.   NEURO/PSYCH: A & O x 4; grossly unremarkable.

## 2019-11-20 VITALS
RESPIRATION RATE: 18 BRPM | TEMPERATURE: 97 F | HEART RATE: 83 BPM | SYSTOLIC BLOOD PRESSURE: 103 MMHG | DIASTOLIC BLOOD PRESSURE: 71 MMHG

## 2019-11-20 PROCEDURE — 76856 US EXAM PELVIC COMPLETE: CPT | Mod: 26

## 2019-11-20 RX ORDER — PROCHLORPERAZINE MALEATE 5 MG
1 TABLET ORAL
Qty: 15 | Refills: 0
Start: 2019-11-20

## 2019-11-20 RX ORDER — ONDANSETRON 8 MG/1
1 TABLET, FILM COATED ORAL
Qty: 12 | Refills: 0
Start: 2019-11-20

## 2019-11-20 RX ADMIN — ONDANSETRON 4 MILLIGRAM(S): 8 TABLET, FILM COATED ORAL at 00:10

## 2019-11-21 LAB
CULTURE RESULTS: SIGNIFICANT CHANGE UP
SPECIMEN SOURCE: SIGNIFICANT CHANGE UP

## 2019-12-06 ENCOUNTER — APPOINTMENT (OUTPATIENT)
Dept: ANTEPARTUM | Facility: CLINIC | Age: 34
End: 2019-12-06
Payer: MEDICAID

## 2019-12-06 ENCOUNTER — ASOB RESULT (OUTPATIENT)
Age: 34
End: 2019-12-06

## 2019-12-06 ENCOUNTER — OUTPATIENT (OUTPATIENT)
Dept: OUTPATIENT SERVICES | Facility: HOSPITAL | Age: 34
LOS: 1 days | Discharge: HOME | End: 2019-12-06

## 2019-12-06 DIAGNOSIS — Z98.891 HISTORY OF UTERINE SCAR FROM PREVIOUS SURGERY: Chronic | ICD-10-CM

## 2019-12-06 DIAGNOSIS — Z3A.15 15 WEEKS GESTATION OF PREGNANCY: ICD-10-CM

## 2019-12-06 PROBLEM — Z00.00 ENCOUNTER FOR PREVENTIVE HEALTH EXAMINATION: Status: ACTIVE | Noted: 2019-12-06

## 2019-12-06 PROCEDURE — 76805 OB US >/= 14 WKS SNGL FETUS: CPT | Mod: 26

## 2019-12-10 DIAGNOSIS — Z36.84 ENCOUNTER FOR ANTENATAL SCREENING FOR FETAL LUNG MATURITY: ICD-10-CM

## 2019-12-10 DIAGNOSIS — Z3A.15 15 WEEKS GESTATION OF PREGNANCY: ICD-10-CM

## 2019-12-10 DIAGNOSIS — O36.60X0 MATERNAL CARE FOR EXCESSIVE FETAL GROWTH, UNSPECIFIED TRIMESTER, NOT APPLICABLE OR UNSPECIFIED: ICD-10-CM

## 2019-12-20 ENCOUNTER — OUTPATIENT (OUTPATIENT)
Dept: OUTPATIENT SERVICES | Facility: HOSPITAL | Age: 34
LOS: 1 days | Discharge: HOME | End: 2019-12-20

## 2019-12-20 DIAGNOSIS — Z34.90 ENCOUNTER FOR SUPERVISION OF NORMAL PREGNANCY, UNSPECIFIED, UNSPECIFIED TRIMESTER: ICD-10-CM

## 2019-12-20 DIAGNOSIS — Z98.891 HISTORY OF UTERINE SCAR FROM PREVIOUS SURGERY: Chronic | ICD-10-CM

## 2019-12-24 ENCOUNTER — OUTPATIENT (OUTPATIENT)
Dept: EMERGENCY DEPT | Facility: HOSPITAL | Age: 34
LOS: 1 days | Discharge: HOME | End: 2019-12-24
Payer: COMMERCIAL

## 2019-12-24 VITALS
DIASTOLIC BLOOD PRESSURE: 63 MMHG | TEMPERATURE: 99 F | SYSTOLIC BLOOD PRESSURE: 104 MMHG | HEART RATE: 93 BPM | RESPIRATION RATE: 20 BRPM

## 2019-12-24 VITALS
SYSTOLIC BLOOD PRESSURE: 114 MMHG | HEART RATE: 89 BPM | DIASTOLIC BLOOD PRESSURE: 67 MMHG | OXYGEN SATURATION: 97 % | TEMPERATURE: 98 F | RESPIRATION RATE: 18 BRPM

## 2019-12-24 DIAGNOSIS — Z98.891 HISTORY OF UTERINE SCAR FROM PREVIOUS SURGERY: Chronic | ICD-10-CM

## 2019-12-24 LAB
ALBUMIN SERPL ELPH-MCNC: 4.1 G/DL — SIGNIFICANT CHANGE UP (ref 3.5–5.2)
ALP SERPL-CCNC: 61 U/L — SIGNIFICANT CHANGE UP (ref 30–115)
ALT FLD-CCNC: 9 U/L — SIGNIFICANT CHANGE UP (ref 0–41)
ANION GAP SERPL CALC-SCNC: 19 MMOL/L — HIGH (ref 7–14)
APTT BLD: 27.3 SEC — SIGNIFICANT CHANGE UP (ref 27–39.2)
AST SERPL-CCNC: 29 U/L — SIGNIFICANT CHANGE UP (ref 0–41)
BASOPHILS # BLD AUTO: 0.02 K/UL — SIGNIFICANT CHANGE UP (ref 0–0.2)
BASOPHILS NFR BLD AUTO: 0.2 % — SIGNIFICANT CHANGE UP (ref 0–1)
BILIRUB SERPL-MCNC: <0.2 MG/DL — SIGNIFICANT CHANGE UP (ref 0.2–1.2)
BLD GP AB SCN SERPL QL: SIGNIFICANT CHANGE UP
BUN SERPL-MCNC: 11 MG/DL — SIGNIFICANT CHANGE UP (ref 10–20)
CALCIUM SERPL-MCNC: 9.4 MG/DL — SIGNIFICANT CHANGE UP (ref 8.5–10.1)
CHLORIDE SERPL-SCNC: 99 MMOL/L — SIGNIFICANT CHANGE UP (ref 98–110)
CO2 SERPL-SCNC: 18 MMOL/L — SIGNIFICANT CHANGE UP (ref 17–32)
CREAT SERPL-MCNC: 0.5 MG/DL — LOW (ref 0.7–1.5)
EOSINOPHIL # BLD AUTO: 0.13 K/UL — SIGNIFICANT CHANGE UP (ref 0–0.7)
EOSINOPHIL NFR BLD AUTO: 1.5 % — SIGNIFICANT CHANGE UP (ref 0–8)
ETHANOL SERPL-MCNC: <10 MG/DL — SIGNIFICANT CHANGE UP
FIBRINOGEN PPP-MCNC: 605 MG/DL — HIGH (ref 204.4–570.6)
GLUCOSE SERPL-MCNC: 86 MG/DL — SIGNIFICANT CHANGE UP (ref 70–99)
HCT VFR BLD CALC: 37.4 % — SIGNIFICANT CHANGE UP (ref 37–47)
HGB BLD-MCNC: 12.2 G/DL — SIGNIFICANT CHANGE UP (ref 12–16)
IMM GRANULOCYTES NFR BLD AUTO: 0.4 % — HIGH (ref 0.1–0.3)
INR BLD: 1.05 RATIO — SIGNIFICANT CHANGE UP (ref 0.65–1.3)
LACTATE SERPL-SCNC: 1 MMOL/L — SIGNIFICANT CHANGE UP (ref 0.7–2)
LIDOCAIN IGE QN: 40 U/L — SIGNIFICANT CHANGE UP (ref 7–60)
LYMPHOCYTES # BLD AUTO: 1.62 K/UL — SIGNIFICANT CHANGE UP (ref 1.2–3.4)
LYMPHOCYTES # BLD AUTO: 19.2 % — LOW (ref 20.5–51.1)
MCHC RBC-ENTMCNC: 27.8 PG — SIGNIFICANT CHANGE UP (ref 27–31)
MCHC RBC-ENTMCNC: 32.6 G/DL — SIGNIFICANT CHANGE UP (ref 32–37)
MCV RBC AUTO: 85.2 FL — SIGNIFICANT CHANGE UP (ref 81–99)
MONOCYTES # BLD AUTO: 0.37 K/UL — SIGNIFICANT CHANGE UP (ref 0.1–0.6)
MONOCYTES NFR BLD AUTO: 4.4 % — SIGNIFICANT CHANGE UP (ref 1.7–9.3)
NEUTROPHILS # BLD AUTO: 6.25 K/UL — SIGNIFICANT CHANGE UP (ref 1.4–6.5)
NEUTROPHILS NFR BLD AUTO: 74.3 % — SIGNIFICANT CHANGE UP (ref 42.2–75.2)
NRBC # BLD: 0 /100 WBCS — SIGNIFICANT CHANGE UP (ref 0–0)
PLATELET # BLD AUTO: 229 K/UL — SIGNIFICANT CHANGE UP (ref 130–400)
POTASSIUM SERPL-MCNC: 4.8 MMOL/L — SIGNIFICANT CHANGE UP (ref 3.5–5)
POTASSIUM SERPL-SCNC: 4.8 MMOL/L — SIGNIFICANT CHANGE UP (ref 3.5–5)
PROT SERPL-MCNC: 7.5 G/DL — SIGNIFICANT CHANGE UP (ref 6–8)
PROTHROM AB SERPL-ACNC: 12.1 SEC — SIGNIFICANT CHANGE UP (ref 9.95–12.87)
RBC # BLD: 4.39 M/UL — SIGNIFICANT CHANGE UP (ref 4.2–5.4)
RBC # FLD: 12.7 % — SIGNIFICANT CHANGE UP (ref 11.5–14.5)
SODIUM SERPL-SCNC: 136 MMOL/L — SIGNIFICANT CHANGE UP (ref 135–146)
WBC # BLD: 8.42 K/UL — SIGNIFICANT CHANGE UP (ref 4.8–10.8)
WBC # FLD AUTO: 8.42 K/UL — SIGNIFICANT CHANGE UP (ref 4.8–10.8)

## 2019-12-24 PROCEDURE — 71045 X-RAY EXAM CHEST 1 VIEW: CPT | Mod: 26

## 2019-12-24 PROCEDURE — 73562 X-RAY EXAM OF KNEE 3: CPT | Mod: 26,RT

## 2019-12-24 PROCEDURE — 99285 EMERGENCY DEPT VISIT HI MDM: CPT

## 2019-12-24 RX ORDER — SODIUM CHLORIDE 9 MG/ML
1000 INJECTION INTRAMUSCULAR; INTRAVENOUS; SUBCUTANEOUS ONCE
Refills: 0 | Status: COMPLETED | OUTPATIENT
Start: 2019-12-24 | End: 2019-12-24

## 2019-12-24 RX ADMIN — SODIUM CHLORIDE 1000 MILLILITER(S): 9 INJECTION INTRAMUSCULAR; INTRAVENOUS; SUBCUTANEOUS at 21:00
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
## 2019-12-24 NOTE — ED PROVIDER NOTE - NS ED ROS FT
Review of Systems  Constitutional:  No fever, chills.  Eyes:  No visual changes, eye pain, or discharge.  ENMT:  No hearing changes, pain, or discharge. No nasal congestion, discharge, or bleeding. No throat pain, swelling, or difficulty swallowing.  Cardiac:  No chest pain, palpitations, syncope, or edema.  Respiratory:  No dyspnea, orthopnea, stridor, wheezing, or cough. No hemoptysis.  GI:  No nausea, vomiting, diarrhea. (+) abdominal pain  :  No dysuria, hematuria, frequency, or burning.   MS:  No back pain.  Skin:  No skin rash, pruritis, jaundice, or lesions.  Neuro:  No headache, dizziness, loss of sensation, or focal weakness.  No change in mental status.   Endocrine: No history of thyroid disease or diabetes.

## 2019-12-24 NOTE — ED PROCEDURE NOTE - US POC STATEMENT
The patient/family was/were informed of limited nature of the exam. Representative images were printed to be scanned into the chart or directly uploaded into the medical record.
The patient/family was/were informed of limited nature of the exam. Representative images were printed to be scanned into the chart or directly uploaded into the medical record.

## 2019-12-24 NOTE — OB PROVIDER TRIAGE NOTE - NSHPPHYSICALEXAM_GEN_ALL_CORE
Vital Signs Last 24 Hrs  T(C): 37 (24 Dec 2019 22:38), Max: 37 (24 Dec 2019 22:38)  T(F): 98.6 (24 Dec 2019 22:38), Max: 98.6 (24 Dec 2019 22:38)  HR: 93 (24 Dec 2019 22:38) (89 - 93)  BP: 104/63 (24 Dec 2019 22:38) (104/63 - 114/67)  RR: 20 (24 Dec 2019 22:38) (18 - 20)  SpO2: 97% (24 Dec 2019 19:50) (97% - 97%)    Gen: A+OX3. NAD.   Abd: Soft, nontender. Gravid. No fundal tenderness.  CV: S1+S2.  Pulm: CTAB  LE: No LE edema.  Sonogram at bedside: FHR 148bpm, cervical length 4.47cm, fetal movement noted, MVP 4cm, fundal placenta, breech  speculum: no bleeding noted, cervix appears closed  SVE: closed/ long / posterior
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
## 2019-12-24 NOTE — OB PROVIDER TRIAGE NOTE - NSHPLABSRESULTS_GEN_ALL_CORE
12.2   8.42  )-----------( 229      ( 24 Dec 2019 20:06 )             37.4       136  |  99  |  11  ----------------------------<  86  4.8   |  18  |  0.5<L>    Ca    9.4      24 Dec 2019 20:06    TPro  7.5  /  Alb  4.1  /  TBili  <0.2  /  DBili  x   /  AST  29  /  ALT  9   /  AlkPhos  61  12-24    ABO RH Interpretation: AB POS (12.24.19 @ 20:06)  Antibody Screen: NEG (12-24-19 @ 20:06)    Fibrinogen Assay: 605.0 mg/dL (12.24.19 @ 21:47)      · ED US Findings: No intra-abdominal or pericardial free fluid  · ED US Diagnosis: Negative FAST
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
## 2019-12-24 NOTE — ED PROVIDER NOTE - PHYSICAL EXAMINATION
VITAL SIGNS: I have reviewed nursing notes and confirm.  CONSTITUTIONAL: Well-developed; well-nourished; in no acute distress.  SKIN: Skin exam is warm and dry, no acute rash.  HEAD: Normocephalic; atraumatic.  EYES: PERRL, EOM intact; conjunctiva and sclera clear.  ENT: No nasal discharge; airway clear.   NECK: Supple; non tender.  CARD: S1, S2 normal; no murmurs, gallops, or rubs. Regular rate and rhythm. No seatbelt sign.   RESP: No wheezes, rales or rhonchi. Speaking in full sentences.   ABD: Normal bowel sounds; gravid; (+) mild lower abdominal TTP; No rebound or guarding. No CVA tenderness.  BACK: No midline or paraspinal TTP.   EXT: Normal ROM. No clubbing, cyanosis or edema. Pelvis stable, no hip TTP or deformity. (+) mild TTP to right knee without swelling, deformity, ecchymosis or erythema. DP 2+ B/L.   NEURO: Alert, oriented. Grossly unremarkable. No focal deficits. Gait steady.
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
## 2019-12-24 NOTE — OB PROVIDER TRIAGE NOTE - NS_OBGYNHISTORY_OBGYN_ALL_OB_FT
OB: LTCSX2, full term, no complications, @[facility], 2014 and 2016. Denies SAB / ETOP.   Gyn: Denies history of abnormal papsmears, STIs, uterine fibroids or ovarian cysts
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
## 2019-12-29 DIAGNOSIS — R10.30 LOWER ABDOMINAL PAIN, UNSPECIFIED: ICD-10-CM

## 2019-12-29 DIAGNOSIS — O26.892 OTHER SPECIFIED PREGNANCY RELATED CONDITIONS, SECOND TRIMESTER: ICD-10-CM
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
## 2019-12-29 DIAGNOSIS — M25.561 PAIN IN RIGHT KNEE: ICD-10-CM

## 2019-12-31 ENCOUNTER — TRANSCRIPTION ENCOUNTER (OUTPATIENT)
Age: 34
End: 2019-12-31

## 2020-01-11 ENCOUNTER — EMERGENCY (EMERGENCY)
Facility: HOSPITAL | Age: 35
LOS: 0 days | Discharge: HOME | End: 2020-01-11
Attending: EMERGENCY MEDICINE | Admitting: EMERGENCY MEDICINE
Payer: MEDICAID

## 2020-01-11 VITALS
RESPIRATION RATE: 19 BRPM | OXYGEN SATURATION: 98 % | TEMPERATURE: 98 F | HEART RATE: 87 BPM | SYSTOLIC BLOOD PRESSURE: 109 MMHG | DIASTOLIC BLOOD PRESSURE: 65 MMHG

## 2020-01-11 VITALS
RESPIRATION RATE: 22 BRPM | SYSTOLIC BLOOD PRESSURE: 119 MMHG | OXYGEN SATURATION: 100 % | HEART RATE: 97 BPM | DIASTOLIC BLOOD PRESSURE: 61 MMHG

## 2020-01-11 DIAGNOSIS — Z3A.20 20 WEEKS GESTATION OF PREGNANCY: ICD-10-CM

## 2020-01-11 DIAGNOSIS — R51 HEADACHE: ICD-10-CM
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
## 2020-01-11 DIAGNOSIS — T78.40XA ALLERGY, UNSPECIFIED, INITIAL ENCOUNTER: ICD-10-CM

## 2020-01-11 DIAGNOSIS — Z98.891 HISTORY OF UTERINE SCAR FROM PREVIOUS SURGERY: Chronic | ICD-10-CM

## 2020-01-11 DIAGNOSIS — R05 COUGH: ICD-10-CM

## 2020-01-11 DIAGNOSIS — R06.02 SHORTNESS OF BREATH: ICD-10-CM
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
## 2020-01-11 DIAGNOSIS — O9A.212 INJURY, POISONING AND CERTAIN OTHER CONSEQUENCES OF EXTERNAL CAUSES COMPLICATING PREGNANCY, SECOND TRIMESTER: ICD-10-CM

## 2020-01-11 LAB
ALBUMIN SERPL ELPH-MCNC: 3.8 G/DL — SIGNIFICANT CHANGE UP (ref 3.5–5.2)
ALP SERPL-CCNC: 62 U/L — SIGNIFICANT CHANGE UP (ref 30–115)
ALT FLD-CCNC: 7 U/L — SIGNIFICANT CHANGE UP (ref 0–41)
ANION GAP SERPL CALC-SCNC: 13 MMOL/L — SIGNIFICANT CHANGE UP (ref 7–14)
AST SERPL-CCNC: 11 U/L — SIGNIFICANT CHANGE UP (ref 0–41)
BASOPHILS # BLD AUTO: 0.02 K/UL — SIGNIFICANT CHANGE UP (ref 0–0.2)
BASOPHILS NFR BLD AUTO: 0.2 % — SIGNIFICANT CHANGE UP (ref 0–1)
BILIRUB SERPL-MCNC: <0.2 MG/DL — SIGNIFICANT CHANGE UP (ref 0.2–1.2)
BUN SERPL-MCNC: 10 MG/DL — SIGNIFICANT CHANGE UP (ref 10–20)
CALCIUM SERPL-MCNC: 9.2 MG/DL — SIGNIFICANT CHANGE UP (ref 8.5–10.1)
CHLORIDE SERPL-SCNC: 104 MMOL/L — SIGNIFICANT CHANGE UP (ref 98–110)
CO2 SERPL-SCNC: 20 MMOL/L — SIGNIFICANT CHANGE UP (ref 17–32)
CREAT SERPL-MCNC: <0.5 MG/DL — LOW (ref 0.7–1.5)
D DIMER BLD IA.RAPID-MCNC: 576 NG/ML DDU — HIGH (ref 0–230)
EOSINOPHIL # BLD AUTO: 0.13 K/UL — SIGNIFICANT CHANGE UP (ref 0–0.7)
EOSINOPHIL NFR BLD AUTO: 1.5 % — SIGNIFICANT CHANGE UP (ref 0–8)
GLUCOSE SERPL-MCNC: 84 MG/DL — SIGNIFICANT CHANGE UP (ref 70–99)
HCT VFR BLD CALC: 36 % — LOW (ref 37–47)
HGB BLD-MCNC: 11.8 G/DL — LOW (ref 12–16)
IMM GRANULOCYTES NFR BLD AUTO: 0.3 % — SIGNIFICANT CHANGE UP (ref 0.1–0.3)
LYMPHOCYTES # BLD AUTO: 1.45 K/UL — SIGNIFICANT CHANGE UP (ref 1.2–3.4)
LYMPHOCYTES # BLD AUTO: 16.9 % — LOW (ref 20.5–51.1)
MCHC RBC-ENTMCNC: 28.2 PG — SIGNIFICANT CHANGE UP (ref 27–31)
MCHC RBC-ENTMCNC: 32.8 G/DL — SIGNIFICANT CHANGE UP (ref 32–37)
MCV RBC AUTO: 85.9 FL — SIGNIFICANT CHANGE UP (ref 81–99)
MONOCYTES # BLD AUTO: 0.3 K/UL — SIGNIFICANT CHANGE UP (ref 0.1–0.6)
MONOCYTES NFR BLD AUTO: 3.5 % — SIGNIFICANT CHANGE UP (ref 1.7–9.3)
NEUTROPHILS # BLD AUTO: 6.66 K/UL — HIGH (ref 1.4–6.5)
NEUTROPHILS NFR BLD AUTO: 77.6 % — HIGH (ref 42.2–75.2)
NRBC # BLD: 0 /100 WBCS — SIGNIFICANT CHANGE UP (ref 0–0)
PLATELET # BLD AUTO: 237 K/UL — SIGNIFICANT CHANGE UP (ref 130–400)
POTASSIUM SERPL-MCNC: 4.1 MMOL/L — SIGNIFICANT CHANGE UP (ref 3.5–5)
POTASSIUM SERPL-SCNC: 4.1 MMOL/L — SIGNIFICANT CHANGE UP (ref 3.5–5)
PROT SERPL-MCNC: 6.7 G/DL — SIGNIFICANT CHANGE UP (ref 6–8)
RBC # BLD: 4.19 M/UL — LOW (ref 4.2–5.4)
RBC # FLD: 12.8 % — SIGNIFICANT CHANGE UP (ref 11.5–14.5)
SODIUM SERPL-SCNC: 137 MMOL/L — SIGNIFICANT CHANGE UP (ref 135–146)
WBC # BLD: 8.59 K/UL — SIGNIFICANT CHANGE UP (ref 4.8–10.8)
WBC # FLD AUTO: 8.59 K/UL — SIGNIFICANT CHANGE UP (ref 4.8–10.8)

## 2020-01-11 PROCEDURE — 71275 CT ANGIOGRAPHY CHEST: CPT | Mod: 26

## 2020-01-11 PROCEDURE — 99285 EMERGENCY DEPT VISIT HI MDM: CPT | Mod: 25

## 2020-01-11 PROCEDURE — 93010 ELECTROCARDIOGRAM REPORT: CPT

## 2020-01-11 PROCEDURE — 71046 X-RAY EXAM CHEST 2 VIEWS: CPT | Mod: 26

## 2020-01-11 PROCEDURE — 76815 OB US LIMITED FETUS(S): CPT | Mod: 26

## 2020-01-11 RX ORDER — IPRATROPIUM/ALBUTEROL SULFATE 18-103MCG
3 AEROSOL WITH ADAPTER (GRAM) INHALATION ONCE
Refills: 0 | Status: COMPLETED | OUTPATIENT
Start: 2020-01-11 | End: 2020-01-11

## 2020-01-11 RX ORDER — ACETAMINOPHEN 500 MG
975 TABLET ORAL ONCE
Refills: 0 | Status: COMPLETED | OUTPATIENT
Start: 2020-01-11 | End: 2020-01-11

## 2020-01-11 RX ORDER — SODIUM CHLORIDE 9 MG/ML
1000 INJECTION INTRAMUSCULAR; INTRAVENOUS; SUBCUTANEOUS ONCE
Refills: 0 | Status: COMPLETED | OUTPATIENT
Start: 2020-01-11 | End: 2020-01-11

## 2020-01-11 RX ORDER — ALBUTEROL 90 UG/1
3 AEROSOL, METERED ORAL
Qty: 50 | Refills: 0
Start: 2020-01-11 | End: 2020-02-09

## 2020-01-11 RX ORDER — DIPHENHYDRAMINE HCL 50 MG
50 CAPSULE ORAL ONCE
Refills: 0 | Status: COMPLETED | OUTPATIENT
Start: 2020-01-11 | End: 2020-01-11

## 2020-01-11 RX ADMIN — Medication 975 MILLIGRAM(S): at 17:45

## 2020-01-11 RX ADMIN — Medication 3 MILLILITER(S): at 23:08

## 2020-01-11 RX ADMIN — Medication 125 MILLIGRAM(S): at 22:39

## 2020-01-11 RX ADMIN — Medication 3 MILLILITER(S): at 22:41

## 2020-01-11 RX ADMIN — SODIUM CHLORIDE 1000 MILLILITER(S): 9 INJECTION INTRAMUSCULAR; INTRAVENOUS; SUBCUTANEOUS at 22:30

## 2020-01-11 RX ADMIN — Medication 50 MILLIGRAM(S): at 22:39

## 2020-01-11 RX ADMIN — Medication 3 MILLILITER(S): at 22:39

## 2020-01-11 RX ADMIN — SODIUM CHLORIDE 1000 MILLILITER(S): 9 INJECTION INTRAMUSCULAR; INTRAVENOUS; SUBCUTANEOUS at 17:45

## 2020-01-11 NOTE — ED PROVIDER NOTE - PHYSICAL EXAMINATION
CONSTITUTIONAL: Well-developed; well-nourished; in no acute distress.   SKIN: warm, dry  HEAD: Normocephalic; atraumatic.  EYES: no conjunctival injection.   ENT: No nasal discharge; airway clear.  NECK: Supple; non tender.  CARD: S1, S2 normal; no murmurs, gallops, or rubs. Regular rate and rhythm.   RESP: No wheezes, rales or rhonchi.  ABD: soft ntnd  EXT: Normal ROM.  No clubbing, cyanosis or edema.   NEURO: Alert, oriented, grossly unremarkable  PSYCH: Cooperative, appropriate.

## 2020-01-11 NOTE — ED PROVIDER NOTE - ATTENDING CONTRIBUTION TO CARE
34 F to ED with HA, CP, SOB and arm pain since 1 day.   20weeks preg, OB=[facility]  No h/o DVT/PE , no prior cardiac or pul disease and other wise no issues with pregancy.  No recent sick contacts, no travels, no N/V or back pain.   AVSS, exam as noted, CTAB, RRR, abdomen soft NTND, (+) bowel sounds.

## 2020-01-11 NOTE — ED PROVIDER NOTE - CARE PROVIDER_API CALL
Onofre Bucio)  Obstetrics and Gynecology  [address] [place] [address]  Phone: [phone]  Fax: [phone]  Follow Up Time: 4-6 Days

## 2020-01-11 NOTE — ED PROVIDER NOTE - CARE PLAN
Principal Discharge DX:	Shortness of breath  Secondary Diagnosis:	Cough Principal Discharge DX:	Shortness of breath  Secondary Diagnosis:	Cough  Secondary Diagnosis:	Pregnancy, unspecified gestational age  Secondary Diagnosis:	Allergic reaction, initial encounter

## 2020-01-11 NOTE — ED ADULT TRIAGE NOTE - CHIEF COMPLAINT QUOTE
Pt c/o headache, SOB, arm numbness, fatigue, and weakness since yesterday. Pt tested for flu at urgent care and was negative. Pt is 20 weeks 6 days pregnant.

## 2020-01-11 NOTE — ED PROVIDER NOTE - PROGRESS NOTE DETAILS
Spoke over the phone with Dr. Bucio, who believes risk of PE is very low. He surmises that SOB is most likely baby pushing up on pt's chest. Also asserts that risk of PE study is very low for fetus. Will follow up in clinic with pt. s/o Dr. Medina Pt evaluated after sign out. Comfortable. Pending CT. Will reevaluate. Authored by Dr. Sultana. s/o received from Dr. Rob for continued care Authored by Dr. Sultana. Pt came back from ct scan, she had shortness of breath and hives to the face. NO wheezing, rales or rhonchi. Pt will be medicated Authored by Dr. Sultana. Discussed labs and imaging. Discussed need to follow up PMD. Discussed signs and symptoms to return to the ED for. Pt understands and agrees with discharge plan

## 2020-01-11 NOTE — ED PROVIDER NOTE - OBJECTIVE STATEMENT
34y F no pmh  presenting with headache x1 day. Acute onset yesterday. Left frontal head, throbbing sensation. +chest pressure +SOB at rest and with exertion +pounding heartbeat +numbness in the arms. No hx of blood clots. Not a smoker. No leg swelling, no leg pain. No n/v. No f/c. 34y F no pmh  presenting with headache x1 day. Acute onset yesterday. Left frontal head, throbbing sensation. +chest pressure +SOB at rest and with exertion +pounding heartbeat +numbness in the arms. No hx of blood clots. Not a smoker. No leg swelling, no leg pain. No n/v. No f/c. Pt coming in from urgent care where she tested negative for the flu.

## 2020-01-11 NOTE — ED PROVIDER NOTE - NS ED ROS FT
Eyes:  No visual changes, eye pain or discharge.  ENMT:  No hearing changes, pain, no sore throat or runny nose, no difficulty swallowing  Cardiac:  +chest pressure   Respiratory:  +SOB  GI:  No nausea, vomiting, diarrhea or abdominal pain.  :  No dysuria, frequency or burning.  MS:  No myalgia, muscle weakness, joint pain or back pain.  Neuro:  +headache  Skin:  No skin rash.   Endocrine: No history of thyroid disease or diabetes.

## 2020-01-11 NOTE — ED ADULT NURSE NOTE - OBJECTIVE STATEMENT
Pt pa&ox3, presents with headache x1 day, pt 21 weeks pregnant. HA began yesterday, left front side of head, pt also c/o chest pressure and SOB, palpitations and numbness in arms. Pt denies n/v/d, fever/chills. Tested neg for flu at [facility].

## 2020-01-11 NOTE — ED PROVIDER NOTE - PATIENT PORTAL LINK FT
You can access the FollowMyHealth Patient Portal offered by [facility] by registering at the following website: [email or web address] By joining [employer]’s FollowMyHealth portal, you will also be able to view your health information using other applications (apps) compatible with our system.

## 2020-01-11 NOTE — ED PROVIDER NOTE - NSFOLLOWUPINSTRUCTIONS_ED_ALL_ED_FT
Cough, Adult     A cough helps to clear your throat and lungs. A cough may last only 2–3 weeks (acute), or it may last longer than 8 weeks (chronic). Many different things can cause a cough. A cough may be a sign of an illness or another medical condition.  Follow these instructions at home:  Pay attention to any changes in your cough.Take medicines only as told by your doctor.  If you were prescribed an antibiotic medicine, take it as told by your doctor. Do not stop taking it even if you start to feel better.Talk with your doctor before you try using a cough medicine.Drink enough fluid to keep your pee (urine) clear or pale yellow.If the air is dry, use a cold steam vaporizer or humidifier in your home.Stay away from things that make you cough at work or at home.If your cough is worse at night, try using extra pillows to raise your head up higher while you sleep.Do not smoke, and try not to be around smoke. If you need help quitting, ask your doctor.Do not have caffeine.Do not drink alcohol.Rest as needed.Contact a doctor if:  You have new problems (symptoms).You cough up yellow fluid (pus).Your cough does not get better after 2–3 weeks, or your cough gets worse.Medicine does not help your cough and you are not sleeping well.You have pain that gets worse or pain that is not helped with medicine.You have a fever.You are losing weight and you do not know why.You have night sweats.Get help right away if:  You cough up blood.You have trouble breathing.Your heartbeat is very fast.This information is not intended to replace advice given to you by your health care provider. Make sure you discuss any questions you have with your health care provider.

## 2020-01-12 ENCOUNTER — TRANSCRIPTION ENCOUNTER (OUTPATIENT)
Age: 35
End: 2020-01-12

## 2020-01-17 ENCOUNTER — OUTPATIENT (OUTPATIENT)
Dept: OUTPATIENT SERVICES | Facility: HOSPITAL | Age: 35
LOS: 1 days | Discharge: HOME | End: 2020-01-17

## 2020-01-17 ENCOUNTER — ASOB RESULT (OUTPATIENT)
Age: 35
End: 2020-01-17

## 2020-01-17 ENCOUNTER — APPOINTMENT (OUTPATIENT)
Dept: ANTEPARTUM | Facility: CLINIC | Age: 35
End: 2020-01-17
Payer: MEDICAID

## 2020-01-17 DIAGNOSIS — Z98.891 HISTORY OF UTERINE SCAR FROM PREVIOUS SURGERY: Chronic | ICD-10-CM

## 2020-01-17 PROCEDURE — 76805 OB US >/= 14 WKS SNGL FETUS: CPT | Mod: 26

## 2020-01-21 DIAGNOSIS — Z36.3 ENCOUNTER FOR ANTENATAL SCREENING FOR MALFORMATIONS: ICD-10-CM

## 2020-01-21 DIAGNOSIS — O35.9XX0 MATERNAL CARE FOR (SUSPECTED) FETAL ABNORMALITY AND DAMAGE, UNSPECIFIED, NOT APPLICABLE OR UNSPECIFIED: ICD-10-CM

## 2020-01-21 DIAGNOSIS — Z3A.21 21 WEEKS GESTATION OF PREGNANCY: ICD-10-CM

## 2020-01-21 DIAGNOSIS — Z36.89 ENCOUNTER FOR OTHER SPECIFIED ANTENATAL SCREENING: ICD-10-CM

## 2020-01-26 ENCOUNTER — EMERGENCY (EMERGENCY)
Facility: HOSPITAL | Age: 35
LOS: 0 days | Discharge: HOME | End: 2020-01-26
Attending: EMERGENCY MEDICINE | Admitting: EMERGENCY MEDICINE
Payer: MEDICAID

## 2020-01-26 VITALS
DIASTOLIC BLOOD PRESSURE: 80 MMHG | RESPIRATION RATE: 18 BRPM | TEMPERATURE: 98 F | SYSTOLIC BLOOD PRESSURE: 143 MMHG | HEART RATE: 100 BPM | HEIGHT: 62 IN | OXYGEN SATURATION: 96 % | WEIGHT: 141.98 LBS

## 2020-01-26 DIAGNOSIS — O21.2 LATE VOMITING OF PREGNANCY: ICD-10-CM

## 2020-01-26 DIAGNOSIS — R11.2 NAUSEA WITH VOMITING, UNSPECIFIED: ICD-10-CM

## 2020-01-26 DIAGNOSIS — Z98.891 HISTORY OF UTERINE SCAR FROM PREVIOUS SURGERY: Chronic | ICD-10-CM

## 2020-01-26 DIAGNOSIS — Z3A.22 22 WEEKS GESTATION OF PREGNANCY: ICD-10-CM

## 2020-01-26 DIAGNOSIS — R19.7 DIARRHEA, UNSPECIFIED: ICD-10-CM

## 2020-01-26 LAB
APPEARANCE UR: CLEAR — SIGNIFICANT CHANGE UP
BACTERIA # UR AUTO: ABNORMAL
BILIRUB UR-MCNC: NEGATIVE — SIGNIFICANT CHANGE UP
COLOR SPEC: YELLOW — SIGNIFICANT CHANGE UP
DIFF PNL FLD: ABNORMAL
EPI CELLS # UR: 10 /HPF — HIGH (ref 0–5)
GLUCOSE UR QL: NEGATIVE — SIGNIFICANT CHANGE UP
HYALINE CASTS # UR AUTO: 2 /LPF — SIGNIFICANT CHANGE UP (ref 0–7)
KETONES UR-MCNC: NEGATIVE — SIGNIFICANT CHANGE UP
LEUKOCYTE ESTERASE UR-ACNC: NEGATIVE — SIGNIFICANT CHANGE UP
NITRITE UR-MCNC: NEGATIVE — SIGNIFICANT CHANGE UP
PH UR: 6 — SIGNIFICANT CHANGE UP (ref 5–8)
PROT UR-MCNC: SIGNIFICANT CHANGE UP
RBC CASTS # UR COMP ASSIST: 1 /HPF — SIGNIFICANT CHANGE UP (ref 0–4)
SP GR SPEC: 1.03 — HIGH (ref 1.01–1.02)
UROBILINOGEN FLD QL: SIGNIFICANT CHANGE UP
WBC UR QL: 4 /HPF — SIGNIFICANT CHANGE UP (ref 0–5)

## 2020-01-26 PROCEDURE — 99284 EMERGENCY DEPT VISIT MOD MDM: CPT

## 2020-01-26 RX ORDER — METOCLOPRAMIDE HCL 10 MG
10 TABLET ORAL ONCE
Refills: 0 | Status: COMPLETED | OUTPATIENT
Start: 2020-01-26 | End: 2020-01-26

## 2020-01-26 RX ORDER — SODIUM CHLORIDE 9 MG/ML
1000 INJECTION INTRAMUSCULAR; INTRAVENOUS; SUBCUTANEOUS ONCE
Refills: 0 | Status: COMPLETED | OUTPATIENT
Start: 2020-01-26 | End: 2020-01-26

## 2020-01-26 RX ADMIN — SODIUM CHLORIDE 1000 MILLILITER(S): 9 INJECTION INTRAMUSCULAR; INTRAVENOUS; SUBCUTANEOUS at 16:30

## 2020-01-26 RX ADMIN — Medication 10 MILLIGRAM(S): at 16:29

## 2020-01-26 NOTE — ED PROVIDER NOTE - ATTENDING CONTRIBUTION TO CARE
35 yo female 22 weeks pregnant c/o nausea and intermittent NBNB vomiting for a few days after eating Yi spinach pie.  Denies any additional complaints such as fever, chills, cough, SOB, abdominal pain, vaginal discharge or bleeding, no urinary complaints, leg pain or swelling or any other acute complaints.  Patient and her  state that several family members have viral like illness.  Well-appearing young female, smiling, NAD, PERRL, mmm, pink conjunctivae, mild clear rhinorrhea, nml work of breathing, lungs CTA b/l, equal air entry, RRR, distal pulses intact, no midline spine or CVA ttp, abdomen non-tender, + gravid uterus, no leg edema or calf ttp, skin nml color and temperature, A&Ox3, no focal neuro deficits.  Will give fluids., PO challenge and d/c home.  Patient and her  are happy with the plan.  Patient has an appointment with Dr Bucio  on Feb 10th.

## 2020-01-26 NOTE — ED PROVIDER NOTE - PROGRESS NOTE DETAILS
Appears very well, feeling much better and requesting to go home.  She is tolerating PO.  Patient to be discharged from ED.. Verbal instructions given, including instructions to return to ED immediately for any new, worsening, or concerning symptoms. Patient endorsed understanding. Written discharge instructions additionally given, including follow-up plan.  Patient was given opportunity to ask questions. feels improved.  requesting d/c. tolerating po without difficulty. Discussed results with pt.  All questions were answered and return precautions discussed.  Pt is asx and comfortable at this time.  Unremarkable re-exam.  No further concerns at this time from pt.  Will follow up with PMD.  Pt understands and agrees with tx plan. OMARI Arroyo: I was directly involved in the management of this patient. Case was discussed with PA Brissa Owusu.

## 2020-01-26 NOTE — ED PROVIDER NOTE - PATIENT PORTAL LINK FT
You can access the FollowMyHealth Patient Portal offered by [facility] by registering at the following website: [email or web address]. By joining [facility]’s FollowMyHealth portal, you will also be able to view your health information using other applications (apps) compatible with our system.

## 2020-01-26 NOTE — ED PROVIDER NOTE - NS ED ROS FT
Review of Systems:  	•	CONSTITUTIONAL: no fever, no diaphoresis, no chills  	•	SKIN: no rash  	•	HEMATOLOGIC: no bleeding, no bruising  	•	EYES: no eye pain, no blurry vision, no diplopia   	•	ENT: no sore throat, no difficulty swallowing, no ear pain  	•	RESPIRATORY: no shortness of breath, no cough  	•	CARDIAC: no chest pain, no palpitations  	•	GI: +nausea, +vomiting. no abdominal pain, no diarrhea   	•	GENITO-URINARY: no vaginal discharge/bleeding, no dysuria; no hematuria, no increased urinary frequency  	•	MUSCULOSKELETAL: no joint pain, no swelling, no redness   	•	NEUROLOGIC: no headache, no weakness, no numbness, no paresthesias

## 2020-01-26 NOTE — ED PROVIDER NOTE - CLINICAL SUMMARY MEDICAL DECISION MAKING FREE TEXT BOX
33 yo female with N/V, but still tolerating PO, non-tender abdomen, afebrile and appearing very well.  Given fluids in ED, reported feeling better, d/ c home.  Strict return precautions given.

## 2020-01-26 NOTE — ED PROVIDER NOTE - PHYSICAL EXAMINATION
CONST: Well appearing in NAD  EYES: PERRL, EOMI, Sclera and conjunctiva clear.   ENT:  Oropharynx normal appearing, no erythema or exudates. No abscess or swelling. Uvula midline.   NECK: Non-tender.  normal ROM. supple   CARD: Normal S1 S2; Normal rate and rhythm  RESP: Equal BS B/L, No wheezes, rhonchi or rales. No distress  GI: Abdomen: gravid, non-tender, no rebound or guarding. no cvat   MS: Normal ROM in all extremities. no calf tenderness or swelling  SKIN: Warm, dry, no acute rashes.   NEURO: A&Ox3, No focal deficits. Strength 5/5 UE/LE bilaterally with no sensory deficits. Steady gait.

## 2020-01-26 NOTE — ED PROVIDER NOTE - CARE PROVIDER_API CALL
Onofre Bucio)  Obstetrics and Gynecology  [address] [place] [address]  Phone: [phone]  Fax: [phone]  Follow Up Time: 1-3 Days

## 2020-01-27 LAB
CULTURE RESULTS: SIGNIFICANT CHANGE UP
SPECIMEN SOURCE: SIGNIFICANT CHANGE UP

## 2020-02-04 ENCOUNTER — EMERGENCY (EMERGENCY)
Facility: HOSPITAL | Age: 35
LOS: 0 days | Discharge: HOME | End: 2020-02-04
Attending: EMERGENCY MEDICINE | Admitting: EMERGENCY MEDICINE
Payer: MEDICAID

## 2020-02-04 VITALS
HEART RATE: 111 BPM | SYSTOLIC BLOOD PRESSURE: 145 MMHG | TEMPERATURE: 98 F | HEIGHT: 62 IN | DIASTOLIC BLOOD PRESSURE: 79 MMHG | OXYGEN SATURATION: 94 % | RESPIRATION RATE: 18 BRPM

## 2020-02-04 VITALS
SYSTOLIC BLOOD PRESSURE: 105 MMHG | DIASTOLIC BLOOD PRESSURE: 64 MMHG | TEMPERATURE: 97 F | RESPIRATION RATE: 18 BRPM | OXYGEN SATURATION: 100 % | HEART RATE: 95 BPM

## 2020-02-04 DIAGNOSIS — Z3A.23 23 WEEKS GESTATION OF PREGNANCY: ICD-10-CM

## 2020-02-04 DIAGNOSIS — Z98.891 HISTORY OF UTERINE SCAR FROM PREVIOUS SURGERY: Chronic | ICD-10-CM

## 2020-02-04 DIAGNOSIS — O21.9 VOMITING OF PREGNANCY, UNSPECIFIED: ICD-10-CM

## 2020-02-04 DIAGNOSIS — R10.84 GENERALIZED ABDOMINAL PAIN: ICD-10-CM

## 2020-02-04 DIAGNOSIS — I10 ESSENTIAL (PRIMARY) HYPERTENSION: ICD-10-CM

## 2020-02-04 LAB
ALBUMIN SERPL ELPH-MCNC: 4.1 G/DL — SIGNIFICANT CHANGE UP (ref 3.5–5.2)
ALP SERPL-CCNC: 70 U/L — SIGNIFICANT CHANGE UP (ref 30–115)
ALT FLD-CCNC: 10 U/L — SIGNIFICANT CHANGE UP (ref 0–41)
AMYLASE P1 CFR SERPL: 68 U/L — SIGNIFICANT CHANGE UP (ref 25–115)
ANION GAP SERPL CALC-SCNC: 14 MMOL/L — SIGNIFICANT CHANGE UP (ref 7–14)
APPEARANCE UR: ABNORMAL
APTT BLD: 26.5 SEC — LOW (ref 27–39.2)
AST SERPL-CCNC: 18 U/L — SIGNIFICANT CHANGE UP (ref 0–41)
BACTERIA # UR AUTO: ABNORMAL
BASOPHILS # BLD AUTO: 0.02 K/UL — SIGNIFICANT CHANGE UP (ref 0–0.2)
BASOPHILS NFR BLD AUTO: 0.2 % — SIGNIFICANT CHANGE UP (ref 0–1)
BILIRUB SERPL-MCNC: 0.3 MG/DL — SIGNIFICANT CHANGE UP (ref 0.2–1.2)
BILIRUB UR-MCNC: NEGATIVE — SIGNIFICANT CHANGE UP
BUN SERPL-MCNC: 14 MG/DL — SIGNIFICANT CHANGE UP (ref 10–20)
CALCIUM SERPL-MCNC: 9.2 MG/DL — SIGNIFICANT CHANGE UP (ref 8.5–10.1)
CHLORIDE SERPL-SCNC: 104 MMOL/L — SIGNIFICANT CHANGE UP (ref 98–110)
CO2 SERPL-SCNC: 18 MMOL/L — SIGNIFICANT CHANGE UP (ref 17–32)
COLOR SPEC: YELLOW — SIGNIFICANT CHANGE UP
CREAT SERPL-MCNC: 0.5 MG/DL — LOW (ref 0.7–1.5)
DIFF PNL FLD: ABNORMAL
EOSINOPHIL # BLD AUTO: 0.11 K/UL — SIGNIFICANT CHANGE UP (ref 0–0.7)
EOSINOPHIL NFR BLD AUTO: 0.9 % — SIGNIFICANT CHANGE UP (ref 0–8)
EPI CELLS # UR: 10 /HPF — HIGH (ref 0–5)
GLUCOSE SERPL-MCNC: 88 MG/DL — SIGNIFICANT CHANGE UP (ref 70–99)
GLUCOSE UR QL: NEGATIVE — SIGNIFICANT CHANGE UP
HCG UR QL: POSITIVE — SIGNIFICANT CHANGE UP
HCT VFR BLD CALC: 37.6 % — SIGNIFICANT CHANGE UP (ref 37–47)
HGB BLD-MCNC: 12.5 G/DL — SIGNIFICANT CHANGE UP (ref 12–16)
HYALINE CASTS # UR AUTO: 8 /LPF — HIGH (ref 0–7)
IMM GRANULOCYTES NFR BLD AUTO: 0.3 % — SIGNIFICANT CHANGE UP (ref 0.1–0.3)
INR BLD: 1.04 RATIO — SIGNIFICANT CHANGE UP (ref 0.65–1.3)
KETONES UR-MCNC: NEGATIVE — SIGNIFICANT CHANGE UP
LACTATE SERPL-SCNC: 1 MMOL/L — SIGNIFICANT CHANGE UP (ref 0.7–2)
LDH SERPL L TO P-CCNC: 227 — SIGNIFICANT CHANGE UP (ref 50–242)
LEUKOCYTE ESTERASE UR-ACNC: NEGATIVE — SIGNIFICANT CHANGE UP
LIDOCAIN IGE QN: 47 U/L — SIGNIFICANT CHANGE UP (ref 7–60)
LYMPHOCYTES # BLD AUTO: 0.65 K/UL — LOW (ref 1.2–3.4)
LYMPHOCYTES # BLD AUTO: 5.1 % — LOW (ref 20.5–51.1)
MCHC RBC-ENTMCNC: 28.2 PG — SIGNIFICANT CHANGE UP (ref 27–31)
MCHC RBC-ENTMCNC: 33.2 G/DL — SIGNIFICANT CHANGE UP (ref 32–37)
MCV RBC AUTO: 84.9 FL — SIGNIFICANT CHANGE UP (ref 81–99)
MONOCYTES # BLD AUTO: 0.41 K/UL — SIGNIFICANT CHANGE UP (ref 0.1–0.6)
MONOCYTES NFR BLD AUTO: 3.2 % — SIGNIFICANT CHANGE UP (ref 1.7–9.3)
NEUTROPHILS # BLD AUTO: 11.48 K/UL — HIGH (ref 1.4–6.5)
NEUTROPHILS NFR BLD AUTO: 90.3 % — HIGH (ref 42.2–75.2)
NITRITE UR-MCNC: NEGATIVE — SIGNIFICANT CHANGE UP
NRBC # BLD: 0 /100 WBCS — SIGNIFICANT CHANGE UP (ref 0–0)
PH UR: 6 — SIGNIFICANT CHANGE UP (ref 5–8)
PLATELET # BLD AUTO: 255 K/UL — SIGNIFICANT CHANGE UP (ref 130–400)
POTASSIUM SERPL-MCNC: 4.6 MMOL/L — SIGNIFICANT CHANGE UP (ref 3.5–5)
POTASSIUM SERPL-SCNC: 4.6 MMOL/L — SIGNIFICANT CHANGE UP (ref 3.5–5)
PROT SERPL-MCNC: 7.3 G/DL — SIGNIFICANT CHANGE UP (ref 6–8)
PROT UR-MCNC: ABNORMAL
PROTHROM AB SERPL-ACNC: 12 SEC — SIGNIFICANT CHANGE UP (ref 9.95–12.87)
RBC # BLD: 4.43 M/UL — SIGNIFICANT CHANGE UP (ref 4.2–5.4)
RBC # FLD: 13.1 % — SIGNIFICANT CHANGE UP (ref 11.5–14.5)
RBC CASTS # UR COMP ASSIST: 5 /HPF — HIGH (ref 0–4)
SODIUM SERPL-SCNC: 136 MMOL/L — SIGNIFICANT CHANGE UP (ref 135–146)
SP GR SPEC: 1.03 — HIGH (ref 1.01–1.02)
URATE SERPL-MCNC: 4.2 MG/DL — SIGNIFICANT CHANGE UP (ref 2.5–7)
UROBILINOGEN FLD QL: SIGNIFICANT CHANGE UP
WBC # BLD: 12.71 K/UL — HIGH (ref 4.8–10.8)
WBC # FLD AUTO: 12.71 K/UL — HIGH (ref 4.8–10.8)
WBC UR QL: 6 /HPF — HIGH (ref 0–5)

## 2020-02-04 PROCEDURE — 99285 EMERGENCY DEPT VISIT HI MDM: CPT

## 2020-02-04 PROCEDURE — 76705 ECHO EXAM OF ABDOMEN: CPT | Mod: 26

## 2020-02-04 RX ORDER — FAMOTIDINE 10 MG/ML
20 INJECTION INTRAVENOUS ONCE
Refills: 0 | Status: COMPLETED | OUTPATIENT
Start: 2020-02-04 | End: 2020-02-04

## 2020-02-04 RX ORDER — PROCHLORPERAZINE MALEATE 5 MG
25 TABLET ORAL ONCE
Refills: 0 | Status: COMPLETED | OUTPATIENT
Start: 2020-02-04 | End: 2020-02-04

## 2020-02-04 RX ORDER — ONDANSETRON 8 MG/1
4 TABLET, FILM COATED ORAL ONCE
Refills: 0 | Status: COMPLETED | OUTPATIENT
Start: 2020-02-04 | End: 2020-02-04

## 2020-02-04 RX ORDER — SODIUM CHLORIDE 9 MG/ML
1000 INJECTION INTRAMUSCULAR; INTRAVENOUS; SUBCUTANEOUS ONCE
Refills: 0 | Status: COMPLETED | OUTPATIENT
Start: 2020-02-04 | End: 2020-02-04

## 2020-02-04 RX ADMIN — ONDANSETRON 4 MILLIGRAM(S): 8 TABLET, FILM COATED ORAL at 12:47

## 2020-02-04 RX ADMIN — SODIUM CHLORIDE 1000 MILLILITER(S): 9 INJECTION INTRAMUSCULAR; INTRAVENOUS; SUBCUTANEOUS at 12:47

## 2020-02-04 RX ADMIN — SODIUM CHLORIDE 1000 MILLILITER(S): 9 INJECTION INTRAMUSCULAR; INTRAVENOUS; SUBCUTANEOUS at 12:48

## 2020-02-04 RX ADMIN — Medication 25 MILLIGRAM(S): at 16:39

## 2020-02-04 RX ADMIN — FAMOTIDINE 20 MILLIGRAM(S): 10 INJECTION INTRAVENOUS at 12:47

## 2020-02-04 NOTE — ED PROVIDER NOTE - NS ED ROS FT
Eyes:  No visual changes, eye pain or discharge.  ENMT:  No hearing changes, pain, no sore throat or runny nose, no difficulty swallowing  Cardiac:  No chest pain, SOB or edema. No chest pain with exertion.  Respiratory:  No cough or respiratory distress.    GI:  +nausea, vomiting, abdominal pain.  :  No dysuria, frequency or burning.  MS:  No myalgia, muscle weakness, joint pain or back pain.  Neuro:  No headache or weakness.  No LOC.  Skin:  No skin rash.   Endocrine: No history of thyroid disease or diabetes.

## 2020-02-04 NOTE — ED PROVIDER NOTE - ATTENDING CONTRIBUTION TO CARE
33 y/o female at 23 weeks gestation, c/o vomiting and diarrhea since this AM. No fever. No ABD pain. No known bad food exposure. + sick contacts. No recent foreign travel. No vaginal bleeding.  O/E: Constitutional: Non-toxic in appearance. Eyes: Mildly dry mucous membranes. No pallor, no jaundice. Neck: Neck supple, no meningeal signs. Respiratory: Lungs CTA and equal b/l. Cardio: S1 S2 regular, no murmur. ABD: ABD gravid, soft, no tenderness, no guarding, no rebound. Extremities: No pedal edema, no calf tenderness. Skin: No skin rash. Neuro: No neurologic deficits.   Imp: Likely gastroenteritis. No sign of bowel obstruction.  A/P: IV fluids, check labs. Will re-assess.

## 2020-02-04 NOTE — ED PROVIDER NOTE - PROGRESS NOTE DETAILS
. good fetal movement. d/w Lacie from ob/gyn re: patient symptoms- states likely infectious rather than ob related. Pt initially hypertensive (no cp sob ha vision changes), with repeat bp 115/64. d/w Dr. Bucio - recommend ruq ultrasound and amylase/lfts in addition to lipase to r/o gallstones. gyn resident Nikolay will see. tolerating po

## 2020-02-04 NOTE — CONSULT NOTE ADULT - ASSESSMENT
INCOMPLETE 33 yo  at 23w5d w/ N/V, w/ isolated elevated BP upon arrival, no signs and symptoms of preeclampsia, BPP 8/8,     -f/u PELs   -f/u UPr/Cr    Dr. Bucio and Dr. Cordon to be made aware. 33 yo  at 23w5d w/ N/V cannot r/o gastritis or infectious etiology, w/ isolated elevated BP upon arrival, no signs and symptoms of preeclampsia, BPP 8/8,      -Recommend GI consult   -Consider flu swab   -PO trial   -Compazine rectal for N/V   -f/u UPr/Cr  -C/w IVF  -Reevaluate after GI consult     Dr. Bucio and Dr. Cordon aware. 33 yo  at 23w5d w/ N/V cannot r/o gastritis or infectious etiology, w/ isolated elevated BP upon arrival, no signs and symptoms of preeclampsia, BPP 8/8,      -Recommend GI consult   -Consider flu swab   -Compazine rectal for N/V   -F/u UPr/Cr  -C/w IVF  - labor precautions/fetal kick count instructions  -Encourage hydration  -F/u w/ PMD as scheduled outpt  -Disposition of pt per ED team     Dr. Bucio and Dr. Cordon aware. 33 yo  at 23w5d w/ N/V cannot r/o gastritis or infectious etiology, w/ isolated elevated BP upon arrival, no signs and symptoms of preeclampsia, BPP 8/8, maternal-fetal wellbeing reassured, no acute obstetric interventions needed,     -Recommend GI consult   -Consider flu swab   -Compazine rectal for N/V   -F/u UPr/Cr  - labor precautions/fetal kick count instructions  -Encourage hydration  -F/u w/ PMD as scheduled outpt  -Disposition of pt per ED team     Dr. Bucio and Dr. Cordon aware.

## 2020-02-04 NOTE — ED ADULT NURSE NOTE - OBJECTIVE STATEMENT
Patient is a 34 y F, 23 weeks pregnant. patient has nausea and vomiting since last night. Patient denies any abdominal pain or cramping. Denies any vaginal bleeding or discharge.

## 2020-02-04 NOTE — CONSULT NOTE ADULT - SUBJECTIVE AND OBJECTIVE BOX
CHARAN JACQUES   34y   Female   [id number]    CHIEF COMPLAINT: N/V at 23w GA    HPI: Upon arrival at 1122 pt had an elevated BP of 145/79, repeat at 1319 115/64. Denies headache, blurry vision and RUQ/epigastric pain. Denies fever/chills, diarrhea, SOB, chest pain, palpitations, sore throat, cough, runny nose, dysuria, hematuria, frequency, urgency, sick contacts and recent travel.     MEDICATIONS (ED):  famotidine 20 mg IVP at 1247  zofran 4 mg IVP at 1247    MEDICATIONS (HOME):  albuterol 2.5 mg/3 mL (0.083%) inhalation solution: 3 milliliter(s) by nebulizer every 6 hours   compro 25 mg rectal suppository: 1 suppository(ies) rectally once a day (at bedtime)   zofran ODT 4 mg oral tablet, disintegratin tab(s) orally every 8 hours for nausea   doxylamine-pyridoxine 10 mg-10 mg oral delayed release tablet: 2 tab(s) orally once a day (at bedtime)     ALLERGIES:   No Known Drug Allergies    PAST MEDICAL & SURGICAL HISTORY:  Ovarian cyst  History of     OB/GYN HISTORY:    Gyn: denies history of abnormal pap, STI, ovarian cysts and uterine fibroids  Obstetric:  G  P     FAMILY HISTORY:  No pertinent family history in first degree relatives    SOCIAL HISTORY:   Denies cigarette use, alcohol use, or illicit drug use    REVIEW OF SYSTEMS:  Neg unless otherwise indicated in the HPI    PHYSICAL EXAM:  Vital Signs Last 24 Hrs  T(C): 36.4 (2020 11:22), Max: 36.4 (2020 11:22)  T(F): 97.6 (2020 11:22), Max: 97.6 (2020 11:22)  HR: 111 (2020 11:22) (111 - 111)  BP: 115/64 (2020 13:19) (115/64 - 145/79)  RR: 18 (2020 11:22) (18 - 18)  SpO2: 94% (2020 11:22) (94% - 94%)  Constitutional: AAOx3, NAD  Respiratory: CTAB  Cardiovascular: NL S1/S2  Gastrointestinal: Soft, nontender, nondistended, no rebound, guarding, or rigidity  Pelvic: Normal vulva, normal vagina, no bleeding, Cervix normal, closed, no bleeding, no abnormal discharge, Uterus anteverted, normal sized, no fundal tenderness, no adnexal masses or tenderness    LABS:                        12.5   12.71 )-----------( 255      ( 2020 12:01 )             37.6     02-04    136  |  104  |  14  ----------------------------<  88  4.6   |  18  |  0.5<L>    Ca    9.2      2020 12:01  TPro  7.3  /  Alb  4.1  /  TBili  0.3  /  DBili  x   /  AST  18  /  ALT  10  /  AlkPhos  70  02-04  PT/INR - ( 2020 12:01 )   PT: 12.00 sec;   INR: 1.04 ratio    PTT - ( 2020 12:01 )  PTT:26.5 sec    Urinalysis Basic - ( 2020 12:12 )  Color: Yellow / Appearance: Slightly Turbid / S.034 / pH: x  Gluc: x / Ketone: Negative  / Bili: Negative / Urobili: <2 mg/dL   Blood: x / Protein: 30 mg/dL / Nitrite: Negative   Leuk Esterase: Negative / RBC: 5 /HPF / WBC 6 /HPF   Sq Epi: x / Non Sq Epi: 10 /HPF / Bacteria: Few    RADIOLOGY & ADDITIONAL STUDIES:   RUQ sono pending CHARAN JACQUES   34y   Female   [id number]    CHIEF COMPLAINT: N/V at 23w5d GA    HPI: 35 yo  at 23w5d w/ AIDE of 2020 by 1st trimester sonogram here for N/V since this morning. Pt reports that throughout the pregnancy she experienced nausea, however this morning her nausea started like any other day and did not stop, she vomited more than 15 times, bilious, nonbloody. She reports that she only took a bite of a toast all day and could not even tolerate water. However pt reports that she just had, around an hour ago, some turkey sandwich and jello. Otherwise denies fever/chills, diarrhea, SOB, chest pain, palpitations, sore throat, cough, runny nose, dysuria, hematuria, frequency, urgency and recent travel. However reports that both of her sons are sick, her older son has been vomiting yesterday but feeling better today and her younger son has a fever. Pt had a flu swab 3 weeks ago that was negative but she could not get the flu vaccine as she always was sick when they offered it to her. Today pt denies contractions, LOF and VB. Feels good FM. No complications in this pregnancy. Last saw Dr. Bucio around 2 weeks ago. Upon arrival at 1122 pt had an elevated BP of 145/79, repeat at 1319 115/64. Pt denies any headache, blurry vision and RUQ/epigastric pain. Also denies any PIH in this pregnancy and in her prior pregnancies.     MEDICATIONS (ED):  famotidine 20 mg IVP at 1247  zofran 4 mg IVP at 1247    MEDICATIONS (HOME):  None    ALLERGIES:  No Known Drug Allergies    PAST MEDICAL & SURGICAL HISTORY:  No pertinent medical history   H/o:  x2  H/o: appendectomy  H/o: left sided breast cyst removal - benign    OB/GYN HISTORY:      Gyn: denies history of abnormal pap, STI, ovarian cysts, or uterine fibroids  Obstetric: ; FT c/s for breech x1, no complications; FT repeat c/s x1, no complications      FAMILY HISTORY:  Asthma (father)    SOCIAL HISTORY:   Denies cigarette use, alcohol use, or illicit drug use    REVIEW OF SYSTEMS:  Neg unless otherwise indicated in the HPI    PHYSICAL EXAM:  Vital Signs Last 24 Hrs  T(C): 36.4 (2020 11:22), Max: 36.4 (2020 11:22)  T(F): 97.6 (2020 11:22), Max: 97.6 (2020 11:22)  HR: 111 (2020 11:22) (111 - 111)  BP: 115/64 (2020 13:19) (115/64 - 145/79)  RR: 18 (2020 11:22) (18 - 18)  SpO2: 94% (2020 11:22) (94% - 94%)  Constitutional: AAOx3, NAD  Respiratory: CTAB  Cardiovascular: Tachycardic with manual HR of 102 bpm, regular rhythm, no extra heart sounds or murmurs   Abd: NT, gravid, no palpable contractions, no RUQ/epigastric tenderness, no rebound, guarding, or rigidity  Pelvic: Deferred  Neuro: UE reflexes 2+ bilaterally  Extremities: No calf tenderness and edema bilaterally   Bedside sono: BPP 8/8, MVP 4.23 cm, cephalic, anterior placenta,  bpm     LABS:                        12.5   12.71 )-----------( 255      ( 2020 12:01 )             37.6     02-04    136  |  104  |  14  ----------------------------<  88  4.6   |  18  |  0.5<L>    Ca    9.2      2020 12:01  TPro  7.3  /  Alb  4.1  /  TBili  0.3  /  DBili  x   /  AST  18  /  ALT  10  /  AlkPhos  70  02-04  PT/INR - ( 2020 12:01 )   PT: 12.00 sec;   INR: 1.04 ratio    PTT - ( 2020 12:01 )  PTT:26.5 sec    Urinalysis Basic - ( 2020 12:12 )  Color: Yellow / Appearance: Slightly Turbid / S.034 / pH: x  Gluc: x / Ketone: Negative  / Bili: Negative / Urobili: <2 mg/dL   Blood: x / Protein: 30 mg/dL / Nitrite: Negative   Leuk Esterase: Negative / RBC: 5 /HPF / WBC 6 /HPF   Sq Epi: x / Non Sq Epi: 10 /HPF / Bacteria: Few    RADIOLOGY & ADDITIONAL STUDIES:   RUQ Sono: Unremarkable

## 2020-02-04 NOTE — ED PROVIDER NOTE - CARE PROVIDER_API CALL
Onofre Bucio)  Obstetrics and Gynecology  [address] [place] [address]  Phone: [phone]  Fax: [phone]  Follow Up Time: 1-3 Days

## 2020-02-04 NOTE — ED ADULT NURSE NOTE - CHPI ED NUR SYMPTOMS NEG
no diarrhea/no blood in stool/no abdominal distension/no burning urination/no chills/no hematuria/no fever/no dysuria

## 2020-02-04 NOTE — ED PROVIDER NOTE - PATIENT PORTAL LINK FT
You can access the FollowMyHealth Patient Portal offered by [facility] by registering at the following website: [email or web address]. By joining [employer]’s FollowMyHealth portal, you will also be able to view your health information using other applications (apps) compatible with our system.
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
## 2020-02-04 NOTE — ED PROVIDER NOTE - PHYSICAL EXAMINATION
Vital Signs: I have reviewed the initial vital signs.  Constitutional: NAD, well-nourished, appears stated age, no acute distress.  HEENT: Airway patent, moist MM, no erythema/swelling/deformity of oral structures. EOMI, PERRLA.  CV: regular rate, regular rhythm, well-perfused extremities, 2+ b/l DP and radial pulses equal.  Lungs: BCTA, no increased WOB.  ABD: NTND, no guarding or rebound, no pulsatile mass, no hernias. gravid uterus palpated on abd exam  MSK: Neck supple, nontender, nl ROM, no stepoff. Chest nontender. Back nontender in TLS spine or to b/l bony structures or flanks. Ext nontender, nl rom, no deformity. no cva ttp  INTEG: Skin warm, dry, no rash.  NEURO: A&Ox3, moving all extremities, normal speech  PSYCH: Calm, cooperative, normal affect and interaction.

## 2020-02-05 ENCOUNTER — OUTPATIENT (OUTPATIENT)
Dept: OUTPATIENT SERVICES | Facility: HOSPITAL | Age: 35
LOS: 1 days | Discharge: HOME | End: 2020-02-05

## 2020-02-05 ENCOUNTER — ASOB RESULT (OUTPATIENT)
Age: 35
End: 2020-02-05

## 2020-02-05 ENCOUNTER — APPOINTMENT (OUTPATIENT)
Dept: ANTEPARTUM | Facility: CLINIC | Age: 35
End: 2020-02-05
Payer: MEDICAID

## 2020-02-05 DIAGNOSIS — Z3A.32 32 WEEKS GESTATION OF PREGNANCY: ICD-10-CM

## 2020-02-05 DIAGNOSIS — Z36.89 ENCOUNTER FOR OTHER SPECIFIED ANTENATAL SCREENING: ICD-10-CM

## 2020-02-05 DIAGNOSIS — Z98.891 HISTORY OF UTERINE SCAR FROM PREVIOUS SURGERY: Chronic | ICD-10-CM

## 2020-02-05 LAB
CREAT ?TM UR-MCNC: 74 MG/DL — SIGNIFICANT CHANGE UP
PROT ?TM UR-MCNC: 11 MG/DLG/24H — SIGNIFICANT CHANGE UP
PROT/CREAT UR-RTO: 0.1 RATIO — SIGNIFICANT CHANGE UP (ref 0–0.2)

## 2020-02-05 PROCEDURE — 76815 OB US LIMITED FETUS(S): CPT | Mod: 26

## 2020-02-06 DIAGNOSIS — Z04.89 ENCOUNTER FOR EXAMINATION AND OBSERVATION FOR OTHER SPECIFIED REASONS: ICD-10-CM

## 2020-02-06 DIAGNOSIS — Z3A.24 24 WEEKS GESTATION OF PREGNANCY: ICD-10-CM

## 2020-02-06 DIAGNOSIS — O35.9XX0 MATERNAL CARE FOR (SUSPECTED) FETAL ABNORMALITY AND DAMAGE, UNSPECIFIED, NOT APPLICABLE OR UNSPECIFIED: ICD-10-CM

## 2020-02-17 ENCOUNTER — TRANSCRIPTION ENCOUNTER (OUTPATIENT)
Age: 35
End: 2020-02-17

## 2020-04-02 ENCOUNTER — ASOB RESULT (OUTPATIENT)
Age: 35
End: 2020-04-02

## 2020-04-02 ENCOUNTER — APPOINTMENT (OUTPATIENT)
Dept: ANTEPARTUM | Facility: CLINIC | Age: 35
End: 2020-04-02
Payer: MEDICAID

## 2020-04-02 PROCEDURE — 76816 OB US FOLLOW-UP PER FETUS: CPT | Mod: 26

## 2020-04-14 NOTE — ED PROCEDURE NOTE - ATTENDING CONTRIBUTION TO CARE
Sore throat  (primary encounter diagnosis)  Plan: salt water gargles upon awakening every day and before bedtime  To use nasal saline in nose to keep secretions thin  Push fluids    History of seasonal allergies  Plan: montelukast (SINGULAIR) 10 MG tablet,         cetirizine (ZYRTEC ALLERGY) 10 MG tablet        If symptoms do not improve after one week on above treatment then will perform strep test       Gino confirms understanding to the above. Prescription written for zyrtec to maintain compliance with plan.       Instructed to call if the problem worsens or does not improve.  Schedule follow-up: one week    
I supervised and was present for key aspects of the procedure.

## 2020-05-02 ENCOUNTER — TRANSCRIPTION ENCOUNTER (OUTPATIENT)
Age: 35
End: 2020-05-02

## 2020-05-19 ENCOUNTER — OUTPATIENT (OUTPATIENT)
Dept: OUTPATIENT SERVICES | Facility: HOSPITAL | Age: 35
LOS: 1 days | Discharge: HOME | End: 2020-05-19

## 2020-05-19 DIAGNOSIS — Z11.59 ENCOUNTER FOR SCREENING FOR OTHER VIRAL DISEASES: ICD-10-CM

## 2020-05-19 DIAGNOSIS — Z98.891 HISTORY OF UTERINE SCAR FROM PREVIOUS SURGERY: Chronic | ICD-10-CM

## 2020-05-20 LAB — SARS-COV-2 RNA SPEC QL NAA+PROBE: SIGNIFICANT CHANGE UP

## 2020-05-21 ENCOUNTER — INPATIENT (INPATIENT)
Facility: HOSPITAL | Age: 35
LOS: 1 days | Discharge: HOME | End: 2020-05-23
Attending: OBSTETRICS & GYNECOLOGY | Admitting: OBSTETRICS & GYNECOLOGY

## 2020-05-21 VITALS — HEART RATE: 106 BPM | SYSTOLIC BLOOD PRESSURE: 107 MMHG | DIASTOLIC BLOOD PRESSURE: 67 MMHG

## 2020-05-21 DIAGNOSIS — Z98.891 HISTORY OF UTERINE SCAR FROM PREVIOUS SURGERY: Chronic | ICD-10-CM

## 2020-05-21 DIAGNOSIS — Z90.49 ACQUIRED ABSENCE OF OTHER SPECIFIED PARTS OF DIGESTIVE TRACT: Chronic | ICD-10-CM

## 2020-05-21 LAB
AMPHET UR-MCNC: NEGATIVE — SIGNIFICANT CHANGE UP
APPEARANCE UR: CLEAR — SIGNIFICANT CHANGE UP
BARBITURATES UR SCN-MCNC: NEGATIVE — SIGNIFICANT CHANGE UP
BASOPHILS # BLD AUTO: 0.03 K/UL — SIGNIFICANT CHANGE UP (ref 0–0.2)
BASOPHILS NFR BLD AUTO: 0.4 % — SIGNIFICANT CHANGE UP (ref 0–1)
BENZODIAZ UR-MCNC: NEGATIVE — SIGNIFICANT CHANGE UP
BILIRUB UR-MCNC: NEGATIVE — SIGNIFICANT CHANGE UP
BLD GP AB SCN SERPL QL: SIGNIFICANT CHANGE UP
BUPRENORPHINE SCREEN, URINE RESULT: NEGATIVE — SIGNIFICANT CHANGE UP
COCAINE METAB.OTHER UR-MCNC: NEGATIVE — SIGNIFICANT CHANGE UP
COLOR SPEC: SIGNIFICANT CHANGE UP
DIFF PNL FLD: SIGNIFICANT CHANGE UP
EOSINOPHIL # BLD AUTO: 0.16 K/UL — SIGNIFICANT CHANGE UP (ref 0–0.7)
EOSINOPHIL NFR BLD AUTO: 2 % — SIGNIFICANT CHANGE UP (ref 0–8)
FENTANYL UR QL: NEGATIVE — SIGNIFICANT CHANGE UP
GLUCOSE UR QL: NEGATIVE — SIGNIFICANT CHANGE UP
HCT VFR BLD CALC: 37.4 % — SIGNIFICANT CHANGE UP (ref 37–47)
HGB BLD-MCNC: 12.5 G/DL — SIGNIFICANT CHANGE UP (ref 12–16)
IMM GRANULOCYTES NFR BLD AUTO: 0.3 % — SIGNIFICANT CHANGE UP (ref 0.1–0.3)
KETONES UR-MCNC: NEGATIVE — SIGNIFICANT CHANGE UP
L&D DRUG SCREEN, URINE: SIGNIFICANT CHANGE UP
LEUKOCYTE ESTERASE UR-ACNC: NEGATIVE — SIGNIFICANT CHANGE UP
LYMPHOCYTES # BLD AUTO: 1.38 K/UL — SIGNIFICANT CHANGE UP (ref 1.2–3.4)
LYMPHOCYTES # BLD AUTO: 17.3 % — LOW (ref 20.5–51.1)
MCHC RBC-ENTMCNC: 28.4 PG — SIGNIFICANT CHANGE UP (ref 27–31)
MCHC RBC-ENTMCNC: 33.4 G/DL — SIGNIFICANT CHANGE UP (ref 32–37)
MCV RBC AUTO: 85 FL — SIGNIFICANT CHANGE UP (ref 81–99)
METHADONE UR-MCNC: NEGATIVE — SIGNIFICANT CHANGE UP
MONOCYTES # BLD AUTO: 0.41 K/UL — SIGNIFICANT CHANGE UP (ref 0.1–0.6)
MONOCYTES NFR BLD AUTO: 5.1 % — SIGNIFICANT CHANGE UP (ref 1.7–9.3)
NEUTROPHILS # BLD AUTO: 6 K/UL — SIGNIFICANT CHANGE UP (ref 1.4–6.5)
NEUTROPHILS NFR BLD AUTO: 74.9 % — SIGNIFICANT CHANGE UP (ref 42.2–75.2)
NITRITE UR-MCNC: NEGATIVE — SIGNIFICANT CHANGE UP
NRBC # BLD: 0 /100 WBCS — SIGNIFICANT CHANGE UP (ref 0–0)
OPIATES UR-MCNC: NEGATIVE — SIGNIFICANT CHANGE UP
OXYCODONE UR-MCNC: NEGATIVE — SIGNIFICANT CHANGE UP
PCP UR-MCNC: NEGATIVE — SIGNIFICANT CHANGE UP
PH UR: 6.5 — SIGNIFICANT CHANGE UP (ref 5–8)
PLATELET # BLD AUTO: 225 K/UL — SIGNIFICANT CHANGE UP (ref 130–400)
PRENATAL SYPHILIS TEST: SIGNIFICANT CHANGE UP
PROPOXYPHENE QUALITATIVE URINE RESULT: NEGATIVE — SIGNIFICANT CHANGE UP
PROT UR-MCNC: NEGATIVE — SIGNIFICANT CHANGE UP
RBC # BLD: 4.4 M/UL — SIGNIFICANT CHANGE UP (ref 4.2–5.4)
RBC # FLD: 13.3 % — SIGNIFICANT CHANGE UP (ref 11.5–14.5)
SP GR SPEC: 1.01 — SIGNIFICANT CHANGE UP (ref 1.01–1.02)
UROBILINOGEN FLD QL: SIGNIFICANT CHANGE UP
WBC # BLD: 8 K/UL — SIGNIFICANT CHANGE UP (ref 4.8–10.8)
WBC # FLD AUTO: 8 K/UL — SIGNIFICANT CHANGE UP (ref 4.8–10.8)

## 2020-05-21 RX ORDER — LANOLIN
1 OINTMENT (GRAM) TOPICAL EVERY 6 HOURS
Refills: 0 | Status: DISCONTINUED | OUTPATIENT
Start: 2020-05-21 | End: 2020-05-23

## 2020-05-21 RX ORDER — IBUPROFEN 200 MG
600 TABLET ORAL EVERY 6 HOURS
Refills: 0 | Status: COMPLETED | OUTPATIENT
Start: 2020-05-21 | End: 2021-04-19

## 2020-05-21 RX ORDER — OXYTOCIN 10 UNIT/ML
333.33 VIAL (ML) INJECTION
Qty: 20 | Refills: 0 | Status: DISCONTINUED | OUTPATIENT
Start: 2020-05-21 | End: 2020-05-23

## 2020-05-21 RX ORDER — METOCLOPRAMIDE HCL 10 MG
10 TABLET ORAL ONCE
Refills: 0 | Status: COMPLETED | OUTPATIENT
Start: 2020-05-21 | End: 2020-05-21

## 2020-05-21 RX ORDER — NALOXONE HYDROCHLORIDE 4 MG/.1ML
0.1 SPRAY NASAL
Refills: 0 | Status: DISCONTINUED | OUTPATIENT
Start: 2020-05-21 | End: 2020-05-23

## 2020-05-21 RX ORDER — CEFAZOLIN SODIUM 1 G
2000 VIAL (EA) INJECTION ONCE
Refills: 0 | Status: COMPLETED | OUTPATIENT
Start: 2020-05-21 | End: 2020-05-21

## 2020-05-21 RX ORDER — SODIUM CHLORIDE 9 MG/ML
1000 INJECTION, SOLUTION INTRAVENOUS ONCE
Refills: 0 | Status: DISCONTINUED | OUTPATIENT
Start: 2020-05-21 | End: 2020-05-21

## 2020-05-21 RX ORDER — OXYCODONE HYDROCHLORIDE 5 MG/1
5 TABLET ORAL ONCE
Refills: 0 | Status: DISCONTINUED | OUTPATIENT
Start: 2020-05-21 | End: 2020-05-23

## 2020-05-21 RX ORDER — HYDROMORPHONE HYDROCHLORIDE 2 MG/ML
0.5 INJECTION INTRAMUSCULAR; INTRAVENOUS; SUBCUTANEOUS
Refills: 0 | Status: DISCONTINUED | OUTPATIENT
Start: 2020-05-21 | End: 2020-05-23

## 2020-05-21 RX ORDER — MORPHINE SULFATE 50 MG/1
0.2 CAPSULE, EXTENDED RELEASE ORAL ONCE
Refills: 0 | Status: DISCONTINUED | OUTPATIENT
Start: 2020-05-21 | End: 2020-05-23

## 2020-05-21 RX ORDER — CITRIC ACID/SODIUM CITRATE 300-500 MG
30 SOLUTION, ORAL ORAL ONCE
Refills: 0 | Status: COMPLETED | OUTPATIENT
Start: 2020-05-21 | End: 2020-05-21

## 2020-05-21 RX ORDER — ACETAMINOPHEN 500 MG
975 TABLET ORAL
Refills: 0 | Status: DISCONTINUED | OUTPATIENT
Start: 2020-05-21 | End: 2020-05-23

## 2020-05-21 RX ORDER — IBUPROFEN 200 MG
600 TABLET ORAL EVERY 6 HOURS
Refills: 0 | Status: DISCONTINUED | OUTPATIENT
Start: 2020-05-21 | End: 2020-05-23

## 2020-05-21 RX ORDER — ACETAMINOPHEN 500 MG
1000 TABLET ORAL ONCE
Refills: 0 | Status: DISCONTINUED | OUTPATIENT
Start: 2020-05-21 | End: 2020-05-23

## 2020-05-21 RX ORDER — KETOROLAC TROMETHAMINE 30 MG/ML
30 SYRINGE (ML) INJECTION EVERY 6 HOURS
Refills: 0 | Status: DISCONTINUED | OUTPATIENT
Start: 2020-05-21 | End: 2020-05-21

## 2020-05-21 RX ORDER — MAGNESIUM HYDROXIDE 400 MG/1
30 TABLET, CHEWABLE ORAL
Refills: 0 | Status: DISCONTINUED | OUTPATIENT
Start: 2020-05-21 | End: 2020-05-23

## 2020-05-21 RX ORDER — SODIUM CHLORIDE 9 MG/ML
1000 INJECTION, SOLUTION INTRAVENOUS
Refills: 0 | Status: DISCONTINUED | OUTPATIENT
Start: 2020-05-21 | End: 2020-05-21

## 2020-05-21 RX ORDER — ENOXAPARIN SODIUM 100 MG/ML
40 INJECTION SUBCUTANEOUS AT BEDTIME
Refills: 0 | Status: DISCONTINUED | OUTPATIENT
Start: 2020-05-21 | End: 2020-05-23

## 2020-05-21 RX ORDER — OXYCODONE HYDROCHLORIDE 5 MG/1
5 TABLET ORAL
Refills: 0 | Status: COMPLETED | OUTPATIENT
Start: 2020-05-21 | End: 2020-05-28

## 2020-05-21 RX ORDER — DIPHENHYDRAMINE HCL 50 MG
25 CAPSULE ORAL EVERY 6 HOURS
Refills: 0 | Status: DISCONTINUED | OUTPATIENT
Start: 2020-05-21 | End: 2020-05-23

## 2020-05-21 RX ORDER — ONDANSETRON 8 MG/1
4 TABLET, FILM COATED ORAL EVERY 6 HOURS
Refills: 0 | Status: DISCONTINUED | OUTPATIENT
Start: 2020-05-21 | End: 2020-05-23

## 2020-05-21 RX ORDER — SODIUM CHLORIDE 9 MG/ML
1000 INJECTION, SOLUTION INTRAVENOUS
Refills: 0 | Status: DISCONTINUED | OUTPATIENT
Start: 2020-05-21 | End: 2020-05-23

## 2020-05-21 RX ORDER — FAMOTIDINE 10 MG/ML
20 INJECTION INTRAVENOUS ONCE
Refills: 0 | Status: COMPLETED | OUTPATIENT
Start: 2020-05-21 | End: 2020-05-21

## 2020-05-21 RX ORDER — CEFAZOLIN SODIUM 1 G
2000 VIAL (EA) INJECTION EVERY 8 HOURS
Refills: 0 | Status: COMPLETED | OUTPATIENT
Start: 2020-05-21 | End: 2020-05-22

## 2020-05-21 RX ORDER — SIMETHICONE 80 MG/1
80 TABLET, CHEWABLE ORAL EVERY 4 HOURS
Refills: 0 | Status: DISCONTINUED | OUTPATIENT
Start: 2020-05-21 | End: 2020-05-23

## 2020-05-21 RX ORDER — DEXAMETHASONE 0.5 MG/5ML
4 ELIXIR ORAL EVERY 6 HOURS
Refills: 0 | Status: DISCONTINUED | OUTPATIENT
Start: 2020-05-21 | End: 2020-05-23

## 2020-05-21 RX ORDER — ACETAMINOPHEN 500 MG
975 TABLET ORAL ONCE
Refills: 0 | Status: COMPLETED | OUTPATIENT
Start: 2020-05-21 | End: 2020-05-21

## 2020-05-21 RX ADMIN — Medication 975 MILLIGRAM(S): at 17:25

## 2020-05-21 RX ADMIN — Medication 30 MILLILITER(S): at 11:58

## 2020-05-21 RX ADMIN — Medication 100 MILLIGRAM(S): at 21:20

## 2020-05-21 RX ADMIN — Medication 100 MILLIGRAM(S): at 12:01

## 2020-05-21 RX ADMIN — NALOXONE HYDROCHLORIDE 0.1 MILLIGRAM(S): 4 SPRAY NASAL at 16:45

## 2020-05-21 RX ADMIN — ENOXAPARIN SODIUM 40 MILLIGRAM(S): 100 INJECTION SUBCUTANEOUS at 21:20

## 2020-05-21 RX ADMIN — FAMOTIDINE 20 MILLIGRAM(S): 10 INJECTION INTRAVENOUS at 11:57

## 2020-05-21 RX ADMIN — Medication 975 MILLIGRAM(S): at 21:21

## 2020-05-21 RX ADMIN — Medication 975 MILLIGRAM(S): at 11:52

## 2020-05-21 RX ADMIN — Medication 10 MILLIGRAM(S): at 11:55

## 2020-05-21 NOTE — CHART NOTE - NSCHARTNOTEFT_GEN_A_CORE
PACU ANESTHESIA ADMISSION NOTE      Procedure: Repeat c section   Post op diagnosis: Previous section      ____  Intubated  TV:______       Rate: ______      FiO2: ______    _x___  Patent Airway    _x___  Full return of protective reflexes    __  Full recovery from anesthesia / back to baseline status    Vitals:  T(C): 97.4  HR: 67  BP: 106/58  RR: 16 (05-21-20 @ 06:34) (16 - 16)  SpO2: 100    Mental Status:  _x___ Awake   _____ Alert   _____ Drowsy   _____ Sedated    Nausea/Vomiting:  _x___  NO       ______Yes,   See Post - Op Orders         Pain Scale (0-10):  __0___    Treatment: _x___ None    ____ See Post - Op/PCA Orders    Post - Operative Fluids:   __x__ Oral   ____ See Post - Op Orders    Plan: Discharge:   ___Home       ____x_Floor     _____Critical Care    _____  Other:_________________    Comments:  No anesthesia issues or complications noted.  Discharge when criteria met.

## 2020-05-21 NOTE — BRIEF OPERATIVE NOTE - NSICDXBRIEFPREOP_GEN_ALL_CORE_FT
PRE-OP DIAGNOSIS:  History of  section 21-May-2020 14:28:33  Javier Alcantara  39 weeks gestation of pregnancy 21-May-2020 14:28:24  Javier Alcantara

## 2020-05-21 NOTE — OB PROVIDER H&P - NSHPPHYSICALEXAM_GEN_ALL_CORE
PHYSICAL EXAM:  T(F): 97.9 (05-21 @ 06:34)  HR: 10 (05-21 @ 06:34)  BP: 107/67 (05-21 @ 06:34)  RR: 16 (05-21 @ 06:34)  Constitutional: AAOx3, NAD  Abdomen: Soft, gravid, nontender, no palpable ctx

## 2020-05-21 NOTE — PROGRESS NOTE ADULT - SUBJECTIVE AND OBJECTIVE BOX
Chief Complaint: Post  section    HPI: Pt doing well, pain well controlled. No overnight events, no acute complaints. She has been ambulating, voiding, passing gas, and tolerating regular diet without difficulty. She is breastfeeding without any issues.    ROS: Denies cardiovascular or respiratory symptoms    PAST MEDICAL & SURGICAL HISTORY:  Asthma  Ovarian cyst  History of appendectomy  H/O:       Physical Exam  Vital Signs Last 24 Hrs  T(F): 97.9 (21 May 2020 06:34), Max: 97.9 (21 May 2020 06:34)  HR: 86 (21 May 2020 17:28) (10 - 124)  BP: 100/65 (21 May 2020 17:15) (86/63 - 113/71)  RR: 16 (21 May 2020 06:34) (16 - 16)    Physical exam:  General - AAOx3, NAD  Heart - S1S2, RRR  Lungs - CTA BL  Abdomen:  - Soft, appropriately tender, mildly distended. Clean, dry, intact surgical dressing in place over pfannenstiel skin incision.  - Fundus firm, appropriately tender, below the umbilicus  Pelvis/Vagina - Normal Lochia  Extremities - No calf tenderness, no swelling    Labs:                        12.5   8.00  )-----------( 225      ( 21 May 2020 06:40 )             37.4     Type + Screen (20 @ 06:40)    ABO RH Interpretation: AB POS  Antibody Screen: NEG (20 @ 06:40)    Prenatal Syphilis Test: Nonreact (20 @ 06:40)

## 2020-05-21 NOTE — OB PROVIDER H&P - HISTORY OF PRESENT ILLNESS
The pt is a 35y y/o G 3 P  @  39  weeks  edc  20 dated by  15 weeks sonogram  who presents to labor & delivery for repeat  section  + fetal movement, no srom no vaginal bleeding no contractions  Previous c/s X 2

## 2020-05-21 NOTE — OB RN DELIVERY SUMMARY - NS_SEPSISRSKCALC_OBGYN_ALL_OB_FT
EOS calculated successfully. EOS Risk Factor: 0.5/1000 live births ([facility] national incidence); GA=40w;Temp=97.9; ROM=0.017; GBS='Negative'; Antibiotics='No antibiotics or any antibiotics < 2 hrs prior to birth'

## 2020-05-21 NOTE — OB PROVIDER H&P - ASSESSMENT
34 y/o  @ 39 weeks previous c/s for repeat  section  Previous c/s X 2  Rubeola non immune    Admit  hydrate  monitor  analgesia  antibiotics OCTOR  T & C x 2 units  MMR post partum

## 2020-05-21 NOTE — PROGRESS NOTE ADULT - ASSESSMENT
34 yo  s/p  section, POD 0, recovering well  -Palomares until AM, monitor urine output  -Pain management per protocol - hold toradol due to history of asthma.  -Advance diet as tolerated  -AM CBC

## 2020-05-21 NOTE — BRIEF OPERATIVE NOTE - OPERATION/FINDINGS
36 yo  at 39w0d GA with history of  section x2.  Viable female infant delivered in cephalic position, clear amniotic fluid, APGARs 9/9.    Pfannenstiel skin incision  Low transverse uterine incision  Dense adhesions between bladder, lower uterine segment, and rectus fascia

## 2020-05-21 NOTE — OB PROVIDER H&P - NSHPLABSRESULTS_GEN_ALL_CORE
gct 127  Rubeola non immune  Sonogram  4/2/20    @ 32 weeks nl growth adequate AF  2/5/20 @ 23 weeks  nl growth  1/17/20 @ 19 weeks SIUP  s= d  12/6/19  @ 15 weeks cayla 5/28/20

## 2020-05-21 NOTE — BRIEF OPERATIVE NOTE - NSICDXBRIEFPOSTOP_GEN_ALL_CORE_FT
POST-OP DIAGNOSIS:  39 weeks gestation of pregnancy 21-May-2020 14:28:46  Javier Alcantara  History of  section 21-May-2020 14:28:39  Javier Alcantara

## 2020-05-21 NOTE — OB PROVIDER H&P - ATTENDING COMMENTS
IMP: IUP @ 39 wks, Previous C/S x 2    Plan: Admit for Rpt C/S # 3, Consent Obtained - R/B/A/P kaylah cassidy

## 2020-05-22 LAB
BASOPHILS # BLD AUTO: 0.02 K/UL — SIGNIFICANT CHANGE UP (ref 0–0.2)
BASOPHILS NFR BLD AUTO: 0.2 % — SIGNIFICANT CHANGE UP (ref 0–1)
EOSINOPHIL # BLD AUTO: 0.09 K/UL — SIGNIFICANT CHANGE UP (ref 0–0.7)
EOSINOPHIL NFR BLD AUTO: 0.7 % — SIGNIFICANT CHANGE UP (ref 0–8)
HCT VFR BLD CALC: 29.6 % — LOW (ref 37–47)
HGB BLD-MCNC: 9.8 G/DL — LOW (ref 12–16)
IMM GRANULOCYTES NFR BLD AUTO: 0.7 % — HIGH (ref 0.1–0.3)
LYMPHOCYTES # BLD AUTO: 1.71 K/UL — SIGNIFICANT CHANGE UP (ref 1.2–3.4)
LYMPHOCYTES # BLD AUTO: 13.9 % — LOW (ref 20.5–51.1)
MCHC RBC-ENTMCNC: 28.6 PG — SIGNIFICANT CHANGE UP (ref 27–31)
MCHC RBC-ENTMCNC: 33.1 G/DL — SIGNIFICANT CHANGE UP (ref 32–37)
MCV RBC AUTO: 86.3 FL — SIGNIFICANT CHANGE UP (ref 81–99)
MONOCYTES # BLD AUTO: 0.91 K/UL — HIGH (ref 0.1–0.6)
MONOCYTES NFR BLD AUTO: 7.4 % — SIGNIFICANT CHANGE UP (ref 1.7–9.3)
NEUTROPHILS # BLD AUTO: 9.45 K/UL — HIGH (ref 1.4–6.5)
NEUTROPHILS NFR BLD AUTO: 77.1 % — HIGH (ref 42.2–75.2)
NRBC # BLD: 0 /100 WBCS — SIGNIFICANT CHANGE UP (ref 0–0)
PLATELET # BLD AUTO: 201 K/UL — SIGNIFICANT CHANGE UP (ref 130–400)
RBC # BLD: 3.43 M/UL — LOW (ref 4.2–5.4)
RBC # FLD: 13.4 % — SIGNIFICANT CHANGE UP (ref 11.5–14.5)
WBC # BLD: 12.26 K/UL — HIGH (ref 4.8–10.8)
WBC # FLD AUTO: 12.26 K/UL — HIGH (ref 4.8–10.8)

## 2020-05-22 RX ORDER — OXYCODONE HYDROCHLORIDE 5 MG/1
5 TABLET ORAL
Refills: 0 | Status: DISCONTINUED | OUTPATIENT
Start: 2020-05-22 | End: 2020-05-23

## 2020-05-22 RX ADMIN — OXYCODONE HYDROCHLORIDE 5 MILLIGRAM(S): 5 TABLET ORAL at 19:41

## 2020-05-22 RX ADMIN — Medication 600 MILLIGRAM(S): at 06:08

## 2020-05-22 RX ADMIN — Medication 975 MILLIGRAM(S): at 08:29

## 2020-05-22 RX ADMIN — Medication 975 MILLIGRAM(S): at 21:35

## 2020-05-22 RX ADMIN — SIMETHICONE 80 MILLIGRAM(S): 80 TABLET, CHEWABLE ORAL at 06:11

## 2020-05-22 RX ADMIN — Medication 600 MILLIGRAM(S): at 17:53

## 2020-05-22 RX ADMIN — Medication 0.5 MILLILITER(S): at 14:59

## 2020-05-22 RX ADMIN — Medication 600 MILLIGRAM(S): at 01:11

## 2020-05-22 RX ADMIN — ENOXAPARIN SODIUM 40 MILLIGRAM(S): 100 INJECTION SUBCUTANEOUS at 21:36

## 2020-05-22 RX ADMIN — Medication 600 MILLIGRAM(S): at 11:21

## 2020-05-22 RX ADMIN — OXYCODONE HYDROCHLORIDE 5 MILLIGRAM(S): 5 TABLET ORAL at 13:31

## 2020-05-22 RX ADMIN — Medication 975 MILLIGRAM(S): at 14:59

## 2020-05-22 RX ADMIN — Medication 975 MILLIGRAM(S): at 03:44

## 2020-05-22 RX ADMIN — Medication 100 MILLIGRAM(S): at 06:05

## 2020-05-22 NOTE — PROGRESS NOTE ADULT - SUBJECTIVE AND OBJECTIVE BOX
Chief Complaint: Post  section    HPI: Pt doing well, pain well controlled. No overnight events, no acute complaints. She has been out of bed to chair, passing gas, and tolerating clear liquid diet without difficulty. She is breastfeeding and bottle feeding without any issues.    ROS: Denies cardiovascular or respiratory symptoms    PAST MEDICAL & SURGICAL HISTORY:  Asthma  Ovarian cyst  History of appendectomy  H/O:       Physical Exam  Vital Signs Last 24 Hrs  T(F): 97.3 (22 May 2020 03:20), Max: 98.2 (21 May 2020 23:48)  HR: 75 (22 May 2020 03:20) (68 - 124)  BP: 101/55 (22 May 2020 03:20) (86/63 - 111/68)  RR: 18 (22 May 2020 03:20) (18 - 18)    Physical exam:  General - AAOx3, NAD  Heart - S1S2, RRR  Lungs - CTA BL  Abdomen:  - Soft, appropriately tender, mildly distended, BS+. Clean, dry, intact steri strips in place over pfannenstiel skin incision. Dressing changed  - Fundus firm, appropriately tender, below the umbilicus  Pelvis/Vagina - Normal Lochia  Extremities - No calf tenderness, no swelling    Labs:                        9.8    12.26 )-----------( 201      ( 22 May 2020 06:02 )             29.6                         12.5   8.00  )-----------( 225      ( 21 May 2020 06:40 )             37.4     Type + Screen (20 @ 06:40)    ABO RH Interpretation: AB POS  Antibody Screen: NEG (20 @ 06:40)    Rubella immune  Measles non-immune    Prenatal Syphilis Test (20 @ 06:40)    Prenatal Syphilis Test: Nonreact: Test Performed at:  [place] [id number]

## 2020-05-22 NOTE — PROGRESS NOTE ADULT - ATTENDING COMMENTS
as above, pt seen at bedside, agree with findings, impression and plan    IMP: S/p RPT LTCS # 3 - POD # 1 - doing well    Plan: dc hurtado, OOB/Ambulation, Regular diet, Pian management, f/u labs

## 2020-05-22 NOTE — PROGRESS NOTE ADULT - ASSESSMENT
34 yo  s/p  section, POD 1, recovering well  -Palomares removed for voiding trial  -Pain management per protocol  -Regular diet  -Encouraged ambulation, PO hydration, and incentive spirometry

## 2020-05-23 ENCOUNTER — TRANSCRIPTION ENCOUNTER (OUTPATIENT)
Age: 35
End: 2020-05-23

## 2020-05-23 VITALS
RESPIRATION RATE: 18 BRPM | DIASTOLIC BLOOD PRESSURE: 59 MMHG | SYSTOLIC BLOOD PRESSURE: 113 MMHG | TEMPERATURE: 97 F | HEART RATE: 87 BPM

## 2020-05-23 RX ORDER — SIMETHICONE 80 MG/1
1 TABLET, CHEWABLE ORAL
Qty: 0 | Refills: 0 | DISCHARGE
Start: 2020-05-23

## 2020-05-23 RX ORDER — SIMETHICONE 80 MG/1
80 TABLET, CHEWABLE ORAL EVERY 4 HOURS
Refills: 0 | Status: DISCONTINUED | OUTPATIENT
Start: 2020-05-23 | End: 2020-05-23

## 2020-05-23 RX ORDER — ACETAMINOPHEN 500 MG
3 TABLET ORAL
Qty: 0 | Refills: 0 | DISCHARGE
Start: 2020-05-23

## 2020-05-23 RX ORDER — IBUPROFEN 200 MG
1 TABLET ORAL
Qty: 0 | Refills: 0 | DISCHARGE
Start: 2020-05-23

## 2020-05-23 RX ADMIN — SIMETHICONE 80 MILLIGRAM(S): 80 TABLET, CHEWABLE ORAL at 14:06

## 2020-05-23 RX ADMIN — Medication 600 MILLIGRAM(S): at 11:05

## 2020-05-23 RX ADMIN — SIMETHICONE 80 MILLIGRAM(S): 80 TABLET, CHEWABLE ORAL at 01:46

## 2020-05-23 RX ADMIN — Medication 600 MILLIGRAM(S): at 00:03

## 2020-05-23 RX ADMIN — Medication 975 MILLIGRAM(S): at 08:50

## 2020-05-23 RX ADMIN — SIMETHICONE 80 MILLIGRAM(S): 80 TABLET, CHEWABLE ORAL at 03:15

## 2020-05-23 RX ADMIN — OXYCODONE HYDROCHLORIDE 5 MILLIGRAM(S): 5 TABLET ORAL at 13:14

## 2020-05-23 RX ADMIN — Medication 600 MILLIGRAM(S): at 18:14

## 2020-05-23 RX ADMIN — SIMETHICONE 80 MILLIGRAM(S): 80 TABLET, CHEWABLE ORAL at 10:39

## 2020-05-23 RX ADMIN — Medication 975 MILLIGRAM(S): at 15:00

## 2020-05-23 RX ADMIN — OXYCODONE HYDROCHLORIDE 5 MILLIGRAM(S): 5 TABLET ORAL at 00:04

## 2020-05-23 RX ADMIN — SIMETHICONE 80 MILLIGRAM(S): 80 TABLET, CHEWABLE ORAL at 18:14

## 2020-05-23 RX ADMIN — Medication 600 MILLIGRAM(S): at 05:54

## 2020-05-23 RX ADMIN — Medication 975 MILLIGRAM(S): at 03:14

## 2020-05-23 RX ADMIN — SIMETHICONE 80 MILLIGRAM(S): 80 TABLET, CHEWABLE ORAL at 05:54

## 2020-05-23 NOTE — DISCHARGE NOTE OB - MEDICATION SUMMARY - MEDICATIONS TO STOP TAKING
I will STOP taking the medications listed below when I get home from the hospital:    doxylamine-pyridoxine 10 mg-10 mg oral delayed release tablet  -- 2 tab(s) by mouth once a day (at bedtime)   -- Do not chew, break, or crush.  Do not drink alcoholic beverages when taking this medication.  May cause drowsiness.  Alcohol may intensify this effect.  Use care when operating dangerous machinery.  Some non-prescription drugs may aggravate your condition.  Read all labels carefully.  If a warning appears, check with your doctor before taking.  Swallow whole.  Do not crush.  Take medication on an empty stomach 1 hour before or 2 to 3 hours after a meal unless otherwise directed by your doctor.  This drug may impair the ability to drive or operate machinery.  Use care until you become familiar with its effects.    Zofran ODT 4 mg oral tablet, disintegrating  -- 1 tab(s) by mouth every 8 hours  -for nausea    Compro 25 mg rectal suppository  -- 1 suppository(ies) rectally once a day (at bedtime)   -- Avoid prolonged or excessive exposure to direct and/or artificial sunlight while taking this medication.  For rectal use only.  It is very important that you take or use this exactly as directed.  Do not skip doses or discontinue unless directed by your doctor.  May cause drowsiness.  Alcohol may intensify this effect.  Use care when operating dangerous machinery.  Obtain medical advice before taking any non-prescription drugs as some may affect the action of this medication.

## 2020-05-23 NOTE — PROGRESS NOTE ADULT - ATTENDING COMMENTS
as above, pt seen at bedside, agree with findings, impression and plan    AB POS, Rubella - Immune, Rubeola - NON-IMMUNE, RPR - NR    Got MMR    IMP: s/p RPT LTCS # 3 - POD # 2 - Doing Well    Plan: dc home (pt request), NSAID's/PNV's, f/u office - 1 wk - wound check

## 2020-05-23 NOTE — DISCHARGE NOTE OB - CARE PLAN
Principal Discharge DX:	 delivery delivered  Goal:	healthy recovery  Assessment and plan of treatment:	Monitor vitals/labs.  To follow up with provider in 1 week for incision check, 6 weeks for postpartum visit.
polysubstance abuse ETOH abuse, substance induced mood disorder

## 2020-05-23 NOTE — PROGRESS NOTE ADULT - ASSESSMENT
A/P: 36 yo  s/p LTCS #3, POD#2, recovering well  - continue routine postpartum care  - monitor vitals and bleeding  - regular diet  - standing simethicone  - lovenox for DVT prophylaxis  - encourage PO hydration, ambulation  - pain management PRN  - incentive spirometry bedside  - anticipate discharge home today    Dr. Chow and Dr. Bucio to be made aware.

## 2020-05-23 NOTE — DISCHARGE NOTE OB - HOSPITAL COURSE
20 @ 06:21    HPI:  The pt is a 35y y/o G 3 P  @  39  weeks  edc  20 dated by  15 weeks sonogram  who presents to labor & delivery for repeat  section  + fetal movement, no srom no vaginal bleeding no contractions  Previous c/s X 2 (21 May 2020 06:48)      PAST MEDICAL & SURGICAL HISTORY:  Asthma  Ovarian cyst  History of appendectomy  H/O:       POST PARTUM COURSE: Uncomplicated postpartum course.         LABS:                        9.8    12.26 )-----------( 201      ( 22 May 2020 06:02 )             29.6                         12.5   8.00  )-----------( 225      ( 21 May 2020 06:40 )             37.4           Allergies    No Known Allergies

## 2020-05-23 NOTE — DISCHARGE NOTE OB - MEDICATION SUMMARY - MEDICATIONS TO TAKE
I will START or STAY ON the medications listed below when I get home from the hospital:    acetaminophen 325 mg oral tablet  -- 3 tab(s) by mouth every 6 hours, As Needed  -- Indication: For pain    ibuprofen 600 mg oral tablet  -- 1 tab(s) by mouth every 6 hours, As Needed  -- Indication: For pain    simethicone 80 mg oral tablet, chewable  -- 1 tab(s) by mouth every 4 hours, As Needed  -- Indication: For gas

## 2020-05-23 NOTE — DISCHARGE NOTE OB - CARE PROVIDER_API CALL
Onofre Bucio  Obstetrics and Gynecology  [address] [place] [id number]  Phone: [phone]  Fax: [phone]  Follow Up Time:

## 2020-05-23 NOTE — PROGRESS NOTE ADULT - SUBJECTIVE AND OBJECTIVE BOX
PGY 1 Note:    Pt doing well, pain well controlled. No acute complaints. Denies fever, chills, N/V, CP, SOB, severe abdominal pain or heavy vaginal bleeding. Ambulating and voiding without difficulty.    Flatus: Yes  Bowel movements: No  Breast or bottle feeding: Bottlefeeding  Diet: Regular    PAST MEDICAL & SURGICAL HISTORY:  Asthma  Ovarian cyst  History of appendectomy  H/O:       Physical Exam  Vital Signs Last 24 Hrs  T(F): 96.8 (23 May 2020 04:07), Max: 97.5 (22 May 2020 08:53)  HR: 83 (23 May 2020 04:07) (80 - 88)  BP: 112/56 (23 May 2020 04:07) (98/53 - 112/57)  RR: 18 (23 May 2020 04:07) (18 - 19)      Gen: AAOx3, NAD  Heart: S1S2, RRR  Lungs: CTABL  Abd: Soft, appropriately tender, mildly distended, BS+  Incision: Steri strips in place, incision clean/dry/intact, no erythema/induration/drainage.  Fundus: Firm, appropriately tender, below the umbilicus  Lochia: Minimal  Ext: No calf tenderness, no swelling    Labs:                        9.8    12.26 )-----------( 201      ( 22 May 2020 06:02 )             29.6                         12.5   8.00  )-----------( 225      ( 21 May 2020 06:40 )             37.4

## 2020-05-23 NOTE — DISCHARGE NOTE OB - PATIENT PORTAL LINK FT
You can access the FollowMyHealth Patient Portal offered by [facility] by registering at the following website: [email or web address]. By joining [email or web address] FollowMyHealth portal, you will also be able to view your health information using other applications (apps) compatible with our system.

## 2020-05-27 DIAGNOSIS — Z3A.39 39 WEEKS GESTATION OF PREGNANCY: ICD-10-CM

## 2020-05-27 DIAGNOSIS — Z11.59 ENCOUNTER FOR SCREENING FOR OTHER VIRAL DISEASES: ICD-10-CM

## 2020-05-27 DIAGNOSIS — O34.211 MATERNAL CARE FOR LOW TRANSVERSE SCAR FROM PREVIOUS CESAREAN DELIVERY: ICD-10-CM

## 2020-05-27 DIAGNOSIS — Z34.83 ENCOUNTER FOR SUPERVISION OF OTHER NORMAL PREGNANCY, THIRD TRIMESTER: ICD-10-CM

## 2020-06-01 ENCOUNTER — OUTPATIENT (OUTPATIENT)
Dept: OUTPATIENT SERVICES | Facility: HOSPITAL | Age: 35
LOS: 1 days | End: 2020-06-01

## 2020-06-01 DIAGNOSIS — Z98.891 HISTORY OF UTERINE SCAR FROM PREVIOUS SURGERY: Chronic | ICD-10-CM

## 2020-06-01 DIAGNOSIS — Z90.49 ACQUIRED ABSENCE OF OTHER SPECIFIED PARTS OF DIGESTIVE TRACT: Chronic | ICD-10-CM

## 2020-06-03 DIAGNOSIS — Z71.89 OTHER SPECIFIED COUNSELING: ICD-10-CM

## 2020-06-03 PROBLEM — J45.909 UNSPECIFIED ASTHMA, UNCOMPLICATED: Chronic | Status: ACTIVE | Noted: 2020-05-21

## 2020-09-07 ENCOUNTER — TRANSCRIPTION ENCOUNTER (OUTPATIENT)
Age: 35
End: 2020-09-07

## 2020-09-21 NOTE — OB RN PATIENT PROFILE - PRO BLOOD TYPE INFANT
Called and talked to patient his BS is elevated last HA1c was 6.4 he is wondering if he should be back on metformin or does he need more lab tests. He had some left over metformin and took 2 of then but they are really old so he stopped.  I will ask Dr Laura Virk AB positive/11/22/19

## 2020-11-11 NOTE — ED ADULT NURSE NOTE - CAS TRG GENERAL NORM CIRC DET
Capillary refill less/equal to 2 seconds Spine appears normal, range of motion is not limited, no muscle or joint tenderness

## 2020-11-24 ENCOUNTER — APPOINTMENT (OUTPATIENT)
Dept: RHEUMATOLOGY | Facility: CLINIC | Age: 35
End: 2020-11-24

## 2021-06-30 NOTE — OB RN DELIVERY SUMMARY - NS_PROPHYLACTICABXNAME_OBGYN_ALL_OB_FT
[No Edema] : there was no peripheral edema [Normal] : normal gait, coordination grossly intact, no focal deficits and deep tendon reflexes were 2+ and symmetric ancef

## 2021-11-03 NOTE — ED ADULT NURSE NOTE - BREATHING, MLM
[facility]     Placed in VM in basket
Faxed with confirmation
Spontaneous, unlabored and symmetrical

## 2021-12-25 NOTE — ED ADULT NURSE NOTE - BREATH SOUNDS, MLM
Nephrology History and Physical      Patient: Larisa Trammell               Sex: female            MRN:  [id number]      YOB: 1939      Age:  82 year old                 Assessment/Plan   #Chronic kidney disease stage IIIb.  82-year-old patient.  Imaging shows normal kidneys, proteinuria 1 to 2 g, creatinine at baseline closer to 1.6 mg/dL.  Etiology is hypertension, diabetes    #Acute kidney injury.  Nonoliguric.  This is in the setting of DVT and submassive PE as well as newly diagnosed AML on chemotherapy.  Appears to have some bladder outlet obstruction with very high postvoid residuals.  No convincing evidence of tumor lysis syndrome time.  No significant hyperkalemia or hyperphosphatemia or hyperuricemia at this time   PLAN  Continue with bladder scan fluid shift with postvoid residual as indicated  Plan continue IV fluids 50/hr  Agree with holding Lisinopril      #HTN - controlled on 2 meds    # Leg edema - chronic plus secondary to DVT  - Normal EF    # AML - on chemo       Tuan Cosme MD  12/25/2021 12:49 PM  Phone [phone]  Office       Subjective:    Larisa Trammell is a 82 year old female who is being seen in consultation for acute kidney injury  82-year-old patient with chronic medical problems including hypertension, obesity, colon cancer about 1 year ago treated with chemotherapy.  Admitted with deep vein thrombosis left leg and  Submassive PE.  Subsequently leukocytosis with urinary evidence of acute myeloid anemia.  Is receiving chemotherapy.  Concern for tumor lysis syndrome.  Creatinine up trending slightly.  She has not voided today.  Bladder scan was done at the bedside 1300 mL noted straight catheterization of bladder  Has not had significant hyperkalemia.  Slight increase in uric acid  The patient denies vomiting or diarrhea. He has not been taking excessive NSAIDs recently. No voiding dysfunction reported, specifically no gross hematuria, dysuria or frequency.  Recent BP control has been good. No reported new skin rashes or acute arthritis. No recent Chest pain, dyspnea or cough. No significant mental status changes  History obtained from chart records, patient, and family.    Past Medical History:   Diagnosis Date   • Arthritis    • Back pain    • Change in bowel movement    • Chest pain    • Colon cancer (CMS/HCC)    • Colon polyp    • Constipation    • Diabetes mellitus (CMS/HCC)    • Diarrhea    • Essential (primary) hypertension    • Glaucoma (increased eye pressure)    • Nausea & vomiting    • Thyroid disease    • Weight loss      Past Surgical History:   Procedure Laterality Date   • Back surgery     • Hemicolectomy  09/13/2019    Lap   • Thyroid surgery        History reviewed. No pertinent family history.  There is no family history of Chronic Kidney Disease  Social History     Tobacco Use   • Smoking status: Never Smoker   • Smokeless tobacco: Never Used   Substance Use Topics   • Alcohol use: Not Currently     Comment: occasional      Current Facility-Administered Medications   Medication Dose Route Frequency Provider Last Rate Last Admin   • insulin glargine (LANTUS) injection 20 Units  20 Units Subcutaneous Nightly Martha Hayden MD   20 Units at 12/24/21 2039   • sodium chloride 0.9% infusion   Intravenous Continuous Martha Hayden MD 50 mL/hr at 12/24/21 2038 New Bag at 12/24/21 2038   • insulin lispro (ADMELOG, HumaLOG) injection 10 Units  10 Units Subcutaneous TID AC Micaela Arcos MD   10 Units at 12/25/21 1208   • insulin lispro (ADMELOG,HumaLOG) - Correction Dose   Subcutaneous TID  Martha Hayden MD   3 Units at 12/25/21 1209   • rosuvastatin (CRESTOR) tablet 10 mg  10 mg Oral Nightly Brianna Mills DO   10 mg at 12/24/21 2039   • HYDROmorphone (DILAUDID) injection 0.3 mg  0.3 mg Intravenous Q3H PRN Martha Hayden MD   0.3 mg at 12/24/21 1417   • HYDROcodone-acetaminophen (NORCO)  MG per tablet 1 tablet  1 tablet Oral Q6H PRN Martha  MD Jackelyn   1 tablet at 12/25/21 0523   • predniSONE (DELTASONE) tablet 30 mg  30 mg Oral Daily with breakfast Martha Hayden MD   30 mg at 12/25/21 0801    Followed by   • [START ON 12/27/2021] predniSONE (DELTASONE) tablet 20 mg  20 mg Oral Daily with breakfast Martha Hayden MD        Followed by   • [START ON 1/1/2022] predniSONE (DELTASONE) tablet 15 mg  15 mg Oral Daily with breakfast Martha Hayden MD        Followed by   • [START ON 1/6/2022] predniSONE (DELTASONE) tablet 10 mg  10 mg Oral Daily with breakfast Martha Hayden MD        Followed by   • [START ON 1/11/2022] predniSONE (DELTASONE) tablet 5 mg  5 mg Oral Daily with breakfast Martha Hayden MD       • sodium chloride 0.9% infusion   Intravenous Continuous PRN Tiara Diggs MD       • acyclovir (ZOVIRAX) capsule 400 mg  400 mg Oral Q12H Maribel Coulter PA-C   400 mg at 12/25/21 0037   • levoFLOXacin (LEVAQUIN) tablet 500 mg  500 mg Oral QAM AC Maribel Coulter PA-C   500 mg at 12/25/21 0523   • posaconazole (NOXAFIL) tablet 300 mg  300 mg Oral Daily with breakfast Maribel Coulter PA-C   300 mg at 12/25/21 0801   • sodium chloride 0.9 % flush bag 250 mL  250 mL Intravenous Once PRN Gt Henderson MD       • heparin 100 UNIT/ML lock flush 500 Units  5 mL Intracatheter Once PRN Gt Henderson MD       • sodium chloride (NORMAL SALINE) 0.9 % bolus 1,000 mL  1,000 mL Intravenous Once PRN Gt Henderson MD       • sodium chloride (NORMAL SALINE) 0.9 % bolus 1,000 mL  1,000 mL Intravenous Once PRN Gt Henderson MD       • EPINEPHrine (ADRENALIN) injection 0.3 mg  0.3 mg Intramuscular Q5 Min PRN Gt Henderson MD       • diphenhydrAMINE (BENADRYL) injection 50 mg  50 mg Intravenous Once PRN Gt Henderson MD       • famotidine (PEPCID) injection 20 mg  20 mg Intravenous Once PRN Gt Henderson MD       • methylPREDNISolone (SOLU-Medrol) PF injection 125 mg  125 mg Intravenous Once PRN Gt Henderson MD       • albuterol inhaler 2 puff  2  puff Inhalation Q20 Min PRN Gt Henderson MD       • albuterol (VENTOLIN) nebulizer 5 mg  5 mg Nebulization Q20 Min PRN Gt Henderson MD       • apixaBAN (ELIQUIS) tablet 5 mg  5 mg Oral 2 times per day Brianna Lina, DO   5 mg at 12/25/21 0802   • melatonin tablet 3 mg  3 mg Oral Nightly Flaca Hoover MD   3 mg at 12/24/21 2039   • prochlorperazine (COMPAZINE) tablet 10 mg  10 mg Oral Q6H PRN Gt Henderson MD       • pantoprazole (PROTONIX) EC tablet 40 mg  40 mg Oral Daily Brianna Lina, DO   40 mg at 12/25/21 0801   • allopurinol (ZYLOPRIM) tablet 100 mg  100 mg Oral Daily Cripsin Neely MD   100 mg at 12/25/21 0802   • diclofenac (VOLTAREN) 1 % gel 2 g  2 g Topical 4x Daily PRN Brianna Lina, DO   2 g at 12/20/21 2100   • polyethylene glycol (MIRALAX) packet 17 g  17 g Oral BID Martha Hayden MD   17 g at 12/25/21 0802   • metoPROLOL succinate (TOPROL-XL) ER tablet 100 mg  100 mg Oral Daily Yara Shams, DO   100 mg at 12/25/21 0801   • lidocaine (LIDOCARE) 4 % patch 1 patch  1 patch Transdermal Daily Yara Shams, DO   1 patch at 12/25/21 0803   • amLODIPine (NORVASC) tablet 10 mg  10 mg Oral Daily Martha Hayden MD   10 mg at 12/25/21 0802   • calcium carbonate (TUMS) chewable tablet 500 mg  500 mg Oral TID PRN Martha Hayden MD       • hydrALAZINE (APRESOLINE) tablet 25 mg  25 mg Oral Q6H PRN Jr Ba Borges DO       • acetaminophen (TYLENOL) tablet 650 mg  650 mg Oral Q4H PRN Vasquez Maradiaga   650 mg at 12/20/21 1514   • aspirin chewable 81 mg  81 mg Oral Daily Brianna Lina, DO   81 mg at 12/25/21 1043   • levothyroxine (SYNTHROID, LEVOTHROID) tablet 125 mcg  125 mcg Oral QAM AC Vasquez Hiren   125 mcg at 12/25/21 0522   • cholecalciferol (VITAMIN D) tablet 2,000 Units  2,000 Units Oral Daily Vasquez Maradiaga   2,000 Units at 12/25/21 0802   • dextrose 50 % injection 25 g  25 g Intravenous PRN Vasquez Maradiaga       • dextrose 50 % injection 12.5 g  12.5 g Intravenous PRN Vasquez Maradiaga       • glucagon (GLUCAGEN) injection 1  mg  1 mg Intramuscular PRN Vasquez Hiren       • dextrose (GLUTOSE) 40 % gel 15 g  15 g Oral PRN Vasquez Hiren       • dextrose (GLUTOSE) 40 % gel 30 g  30 g Oral PRN Vasquez Hiren       • fluticasone-vilanterol (BREO ELLIPTA) 100-25 MCG/INH inhaler 1 puff  1 puff Inhalation Daily Resp Vasquez Hiren   1 puff at 12/25/21 1224      Prior to Admission medications    Medication Sig Start Date End Date Taking? Authorizing Provider   Multiple Vitamins-Minerals (Multivitamin Adults) Tab Take 1 tablet by mouth daily.   Yes Outside Provider   VITAMIN D, CHOLECALCIFEROL, PO Take 2,000 Units by mouth daily.   Yes Outside Provider   amLODIPine (NORVASC) 10 MG tablet Take 1 tablet by mouth daily. Do not start before March 7, 2021. 3/7/21  Yes Esther Mccann MD   Levothyroxine Sodium 125 MCG Cap    Yes Outside Provider   pantoprazole (PROTONIX) 40 MG 40 mg by Per NG tube route daily.   Yes Outside Provider   metoPROLOL succinate (TOPROL-XL) 100 MG 24 hr tablet Take 1 tablet by mouth daily. Do not start before December 23, 2021. 12/23/21   Brianna Mills, DO   aspirin 81 MG chewable tablet Chew 1 tablet by mouth daily. Do not start before December 23, 2021. 12/23/21   Brianna Mills, DO   rosuvastatin (CRESTOR) 20 MG tablet Take 0.5 tablets by mouth daily. 12/22/21   Brianna Mills, DO   acetaminophen (TYLENOL) 325 MG tablet Take 2 tablets by mouth every 4 hours as needed (60). 12/22/21   Brianna Mills, DO   acyclovir (ZOVIRAX) 200 MG capsule Take 2 capsules by mouth every 12 hours. Do not start before December 23, 2021. 12/23/21   Brianna Wests, DO   allopurinol (ZYLOPRIM) 100 MG tablet Take 1 tablet by mouth daily. Do not start before December 23, 2021. 12/23/21   Brianna Mills, DO   diclofenac (VOLTAREN) 1 % gel Apply 2 g topically 4 times daily as needed (pain in hip/back). 12/22/21   Brianna Mills, DO   docusate sodium-sennosides (SENOKOT S) 50-8.6 MG per tablet Take 1 tablet by mouth 2 times daily. 12/22/21   Brianna Mills,    levoFLOXacin  (LEVAQUIN) 500 MG tablet Take 1 tablet by mouth daily (before breakfast). Do not start before December 23, 2021. 12/23/21 Yara Lina, DO   lidocaine (LIDOCARE) 4 % patch Place 2 patches onto the skin every 24 hours. 12/22/21 Yara Lina, DO   melatonin 3 MG Take 1 tablet by mouth nightly. 12/22/21 Yara Lina, DO   polyethylene glycol (MIRALAX) 17 g packet Take 17 g by mouth 2 times daily. Stir and dissolve powder in any 4 to 8 ounces of beverage, then drink. 12/22/21 Yara Shams, DO   posaconazole (NOXAFIL) 100 MG tablet Take 3 tablets by mouth daily (with breakfast). Do not start before December 23, 2021. Take with food. 12/23/21 Yara Shams, DO   apixaBAN (Eliquis) 5 MG Tab Take 1 tablet by mouth every 12 hours. 12/22/21 Yara Shams, DO   fluticasone-salmeterol 100-50 MCG/DOSE inhaler Inhale 1 puff into the lungs two times daily.    Outside Provider   albuterol 108 (90 Base) MCG/ACT inhaler Inhale 2 puffs into the lungs every 4 hours as needed for Shortness of Breath or Wheezing.    Outside Provider      ALLERGIES:   Allergen Reactions   • Erythromycin PRURITUS   • Penicillins Other (See Comments)   • Shrimp   (Food) SWELLING       Review of Systems:  Review of Systems   All other systems were reviewed and negative except as mentioned in HPI  Objective:     Visit Vitals  BP (!) 150/76   Pulse 92   Temp 97.7 °F (36.5 °C) (Oral)   Resp 16   Ht 5' 4\" (1.626 m)   Wt 107.3 kg (236 lb 8 oz)   SpO2 97%   BMI 40.60 kg/m²      I/O last 3 completed shifts:  In: 160 [P.O.:160]  Out: 675 [Urine:675]    Physical Exam:  Constitutional :Patient is awake and interactive, Vitals noted.  Eyes: Conjunctiva normal, Eyelids not drooping.  ENT: Normal hearing. Nose not deformed, moist oral mucosa.  Neck: No obvious mass, no lymphadenopathy.  Cardiovascular : Rate and rhythm is regular. No murmur. Peripheral edema is noted 1 plus b/l  Respiratory: Lungs are clear to auscultation. Equal Breath sounds bilaterally.  GI : Abdomen is  soft, non tender. There is no obvious ascites. Obese   : External genitalia not examined. There is no Booker catheter in place  Musculoskeletal :No obvious deformity or swelling of joints. There is  ankle edema.  Skin: No rashes noted.  Neuro:Patient is alert and oriented times 3. There is no obvious focal deficit.  Pysch: Cooperative, Affect is normal      • insulin glargine  20 Units Subcutaneous Nightly   • insulin lispro  10 Units Subcutaneous TID AC   • insulin lispro   Subcutaneous TID WC   • rosuvastatin  10 mg Oral Nightly   • predniSONE  30 mg Oral Daily with breakfast    Followed by   • [START ON 12/27/2021] predniSONE  20 mg Oral Daily with breakfast    Followed by   • [START ON 1/1/2022] predniSONE  15 mg Oral Daily with breakfast    Followed by   • [START ON 1/6/2022] predniSONE  10 mg Oral Daily with breakfast    Followed by   • [START ON 1/11/2022] predniSONE  5 mg Oral Daily with breakfast   • acyclovir  400 mg Oral Q12H   • levoFLOXacin  500 mg Oral QAM AC   • posaconazole  300 mg Oral Daily with breakfast   • apixaBAN  5 mg Oral 2 times per day   • melatonin  3 mg Oral Nightly   • pantoprazole  40 mg Oral Daily   • allopurinol  100 mg Oral Daily   • polyethylene glycol  17 g Oral BID   • metoPROLOL succinate  100 mg Oral Daily   • lidocaine  1 patch Transdermal Daily   • amLODIPine  10 mg Oral Daily   • aspirin  81 mg Oral Daily   • levothyroxine  125 mcg Oral QAM AC   • cholecalciferol  2,000 Units Oral Daily   • fluticasone-vilanterol  1 puff Inhalation Daily Resp       Lab/Data Reviewed:  Recent Results (from the past 24 hour(s))   GLUCOSE, BEDSIDE - POINT OF CARE    Collection Time: 12/24/21  4:30 PM   Result Value Ref Range    GLUCOSE, BEDSIDE - POINT OF CARE 123 (H) 70 - 99 mg/dL   GLUCOSE, BEDSIDE - POINT OF CARE    Collection Time: 12/24/21  8:23 PM   Result Value Ref Range    GLUCOSE, BEDSIDE - POINT OF CARE 155 (H) 70 - 99 mg/dL   Comprehensive Metabolic Panel    Collection Time:  12/25/21  4:33 AM   Result Value Ref Range    Fasting Status      Sodium 135 135 - 145 mmol/L    Potassium 4.5 3.4 - 5.1 mmol/L    Chloride 102 98 - 107 mmol/L    Carbon Dioxide 30 21 - 32 mmol/L    Anion Gap 8 (L) 10 - 20 mmol/L    Glucose 91 70 - 99 mg/dL    BUN 48 (H) 6 - 20 mg/dL    Creatinine 2.12 (H) 0.51 - 0.95 mg/dL    Glomerular Filtration Rate 25 (L) >=60    BUN/ Creatinine Ratio 23 7 - 25    Calcium 9.3 8.4 - 10.2 mg/dL    Bilirubin, Total 0.5 0.2 - 1.0 mg/dL    GOT/AST 48 (H) <=37 Units/L    GPT/ALT 48 <64 Units/L    Alkaline Phosphatase 108 45 - 117 Units/L    Albumin 2.1 (L) 3.6 - 5.1 g/dL    Protein, Total 6.5 6.4 - 8.2 g/dL    Globulin 4.4 (H) 2.0 - 4.0 g/dL    A/G Ratio 0.5 (L) 1.0 - 2.4   Uric Acid    Collection Time: 12/25/21  4:33 AM   Result Value Ref Range    Uric Acid 6.2 (H) 2.6 - 5.9 mg/dL   Magnesium    Collection Time: 12/25/21  4:33 AM   Result Value Ref Range    Magnesium 2.2 1.7 - 2.4 mg/dL   Phosphorus    Collection Time: 12/25/21  4:33 AM   Result Value Ref Range    Phosphorus 4.0 2.4 - 4.7 mg/dL   CBC with Automated Differential (performable only)    Collection Time: 12/25/21  4:33 AM   Result Value Ref Range    WBC 38.8 (H) 4.2 - 11.0 K/mcL    RBC 2.49 (L) 4.00 - 5.20 mil/mcL    HGB 7.7 (L) 12.0 - 15.5 g/dL    HCT 24.4 (L) 36.0 - 46.5 %    MCV 98.0 78.0 - 100.0 fl    MCH 30.9 26.0 - 34.0 pg    MCHC 31.6 (L) 32.0 - 36.5 g/dL    RDW-CV 17.1 (H) 11.0 - 15.0 %    RDW-SD 61.1 (H) 39.0 - 50.0 fL     (L) 140 - 450 K/mcL    NRBC 0 <=0 /100 WBC   Manual Differential    Collection Time: 12/25/21  4:33 AM   Result Value Ref Range    Neutrophil, Percent 39 %    Lymphocytes, Percent 24 %    Mono, Percent 28 %    Eosinophils, Percent 0 %    Basophils, Percent 0 %    Bands, Percent 3 0 - 10 %    Metamyelocytes, Percent  2 0 - 2 %    Myelocytes, Percent 1 (H) <=0 %    Blast, Percent 3 (H) <=0 %    Absolute Neutrophil 16.3 (H) 1.8 - 7.7 K/mcL    Absolute Lymphocytes 9.3 (H) 1.0 - 4.0 K/mcL     Absolute Monocytes 10.9 (H) 0.3 - 0.9 K/mcL    Absolute Eosinophils 0.0 0.0 - 0.5 K/mcL    Absolute Basophils 0.0 0.0 - 0.3 K/mcL    WBC Morphology Abnormal (A) Normal    Hypochromia Few     Macrocytosis Few     Ovalocytes Few     Platelet Morphology Normal Normal   GLUCOSE, BEDSIDE - POINT OF CARE    Collection Time: 12/25/21  7:52 AM   Result Value Ref Range    GLUCOSE, BEDSIDE - POINT OF CARE 71 70 - 99 mg/dL   GLUCOSE, BEDSIDE - POINT OF CARE    Collection Time: 12/25/21 11:24 AM   Result Value Ref Range    GLUCOSE, BEDSIDE - POINT OF CARE 177 (H) 70 - 99 mg/dL              Clear

## 2022-01-13 NOTE — ED PROVIDER NOTE - NS ED ATTENDING STATEMENT MOD
Patient tolerated infusion well. Instructions reviewed, AVS provided. All questions and concerns addressed. Ambulated without difficulty to car.    I have personally seen and examined this patient.  I have fully participated in the care of this patient. I have reviewed all pertinent clinical information, including history, physical exam, plan and the Resident’s note and agree except as noted.

## 2022-02-01 ENCOUNTER — TRANSCRIPTION ENCOUNTER (OUTPATIENT)
Age: 37
End: 2022-02-01

## 2022-03-10 NOTE — ED PROVIDER NOTE - NSDCPRINTRESULTS_ED_ALL_ED
Patient requests all Lab and Radiology Results on their Discharge Instructions Xelvictor manuelz Pregnancy And Lactation Text: This medication is Pregnancy Category D and is not considered safe during pregnancy.  The risk during breast feeding is also uncertain.

## 2022-04-06 ENCOUNTER — TRANSCRIPTION ENCOUNTER (OUTPATIENT)
Age: 37
End: 2022-04-06

## 2022-06-07 NOTE — ED ADULT NURSE NOTE - MODE OF DISCHARGE
-Diabetes is above goal with A1C 9.4.  -Discussed dietary and exercise guidelines with patient.  He is interested in diabetes education.  Will send referral for this.  -Discussed importance of yearly diabetic eye exams.  -Discussed importance of checking BG's regularly.  -Discontinue Actos.  -Continue Jardiance 25 mg QD.  -Increased Metformin 1000 mg QD.  -Start Trulicity 0.75 mg weekly.  Patient has no personal history of pancreatitis, no family history of MEN syndrome or medullary thyroid cancer. Possible side effects including nausea, bloating, other GI upset and rarely pancreatitis were discussed. He was advised to call the office with any symptoms or concerns.   -S/S hypoglycemia reviewed with Rule of 15's advised.  -Follow-up in 1 month.   Ambulatory

## 2022-06-27 NOTE — OB RN PATIENT PROFILE - NS_ARRIVALFROM_OBGYN_ALL_OB
Home Opioid Counseling: I discussed with the patient the potential side effects of opioids including but not limited to addiction, altered mental status, and depression. I stressed avoiding alcohol, benzodiazepines, muscle relaxants and sleep aids unless specifically okayed by a physician. The patient verbalized understanding of the proper use and possible adverse effects of opioids. All of the patient's questions and concerns were addressed. They were instructed to flush the remaining pills down the toilet if they did not need them for pain.

## 2022-10-10 NOTE — ED ADULT NURSE NOTE - NS ED NOTE ABUSE RESPONSE YN
pt finished 6th round of chemo, has neutropenia, sent by Oncologist  A&Ox3, resp wnl, here for hydration & ABx Yes

## 2022-10-26 NOTE — ED PROCEDURE NOTE - NS ED ATTENDING STATEMENT MOD
I have personally performed a face to face diagnostic evaluation on this patient. I have reviewed the ACP note and agree with the history, exam and plan of care, except as noted.
I have personally performed a face to face diagnostic evaluation on this patient. I have reviewed the ACP note and agree with the history, exam and plan of care, except as noted.
abnormal second phase labor

## 2023-02-12 ENCOUNTER — EMERGENCY (EMERGENCY)
Facility: HOSPITAL | Age: 38
LOS: 0 days | Discharge: HOME | End: 2023-02-12
Attending: EMERGENCY MEDICINE
Payer: MEDICAID

## 2023-02-12 VITALS
HEART RATE: 98 BPM | WEIGHT: 123.9 LBS | SYSTOLIC BLOOD PRESSURE: 108 MMHG | OXYGEN SATURATION: 98 % | TEMPERATURE: 97 F | RESPIRATION RATE: 18 BRPM | DIASTOLIC BLOOD PRESSURE: 78 MMHG

## 2023-02-12 DIAGNOSIS — Z98.891 HISTORY OF UTERINE SCAR FROM PREVIOUS SURGERY: Chronic | ICD-10-CM

## 2023-02-12 DIAGNOSIS — Z87.42 PERSONAL HISTORY OF OTHER DISEASES OF THE FEMALE GENITAL TRACT: ICD-10-CM

## 2023-02-12 DIAGNOSIS — R11.2 NAUSEA WITH VOMITING, UNSPECIFIED: ICD-10-CM

## 2023-02-12 DIAGNOSIS — R19.7 DIARRHEA, UNSPECIFIED: ICD-10-CM

## 2023-02-12 DIAGNOSIS — Z90.49 ACQUIRED ABSENCE OF OTHER SPECIFIED PARTS OF DIGESTIVE TRACT: Chronic | ICD-10-CM

## 2023-02-12 DIAGNOSIS — Z90.49 ACQUIRED ABSENCE OF OTHER SPECIFIED PARTS OF DIGESTIVE TRACT: ICD-10-CM

## 2023-02-12 DIAGNOSIS — J45.909 UNSPECIFIED ASTHMA, UNCOMPLICATED: ICD-10-CM

## 2023-02-12 LAB
ALBUMIN SERPL ELPH-MCNC: 4.4 G/DL — SIGNIFICANT CHANGE UP (ref 3.5–5.2)
ALP SERPL-CCNC: 81 U/L — SIGNIFICANT CHANGE UP (ref 30–115)
ALT FLD-CCNC: 19 U/L — SIGNIFICANT CHANGE UP (ref 0–41)
ANION GAP SERPL CALC-SCNC: 12 MMOL/L — SIGNIFICANT CHANGE UP (ref 7–14)
APPEARANCE UR: CLEAR — SIGNIFICANT CHANGE UP
AST SERPL-CCNC: 18 U/L — SIGNIFICANT CHANGE UP (ref 0–41)
BACTERIA # UR AUTO: ABNORMAL
BASOPHILS # BLD AUTO: 0.03 K/UL — SIGNIFICANT CHANGE UP (ref 0–0.2)
BASOPHILS NFR BLD AUTO: 0.3 % — SIGNIFICANT CHANGE UP (ref 0–1)
BILIRUB DIRECT SERPL-MCNC: <0.2 MG/DL — SIGNIFICANT CHANGE UP (ref 0–0.3)
BILIRUB INDIRECT FLD-MCNC: >0.5 MG/DL — SIGNIFICANT CHANGE UP (ref 0.2–1.2)
BILIRUB SERPL-MCNC: 0.7 MG/DL — SIGNIFICANT CHANGE UP (ref 0.2–1.2)
BILIRUB UR-MCNC: NEGATIVE — SIGNIFICANT CHANGE UP
BUN SERPL-MCNC: 17 MG/DL — SIGNIFICANT CHANGE UP (ref 10–20)
CALCIUM SERPL-MCNC: 9.1 MG/DL — SIGNIFICANT CHANGE UP (ref 8.4–10.5)
CHLORIDE SERPL-SCNC: 102 MMOL/L — SIGNIFICANT CHANGE UP (ref 98–110)
CO2 SERPL-SCNC: 23 MMOL/L — SIGNIFICANT CHANGE UP (ref 17–32)
COLOR SPEC: YELLOW — SIGNIFICANT CHANGE UP
CREAT SERPL-MCNC: 0.6 MG/DL — LOW (ref 0.7–1.5)
DIFF PNL FLD: ABNORMAL
EGFR: 120 ML/MIN/1.73M2 — SIGNIFICANT CHANGE UP
EOSINOPHIL # BLD AUTO: 0.07 K/UL — SIGNIFICANT CHANGE UP (ref 0–0.7)
EOSINOPHIL NFR BLD AUTO: 0.6 % — SIGNIFICANT CHANGE UP (ref 0–8)
EPI CELLS # UR: 10 /HPF — HIGH (ref 0–5)
GLUCOSE SERPL-MCNC: 118 MG/DL — HIGH (ref 70–99)
GLUCOSE UR QL: NEGATIVE — SIGNIFICANT CHANGE UP
HCG SERPL QL: NEGATIVE — SIGNIFICANT CHANGE UP
HCT VFR BLD CALC: 43.8 % — SIGNIFICANT CHANGE UP (ref 37–47)
HGB BLD-MCNC: 14.4 G/DL — SIGNIFICANT CHANGE UP (ref 12–16)
HYALINE CASTS # UR AUTO: 9 /LPF — HIGH (ref 0–7)
IMM GRANULOCYTES NFR BLD AUTO: 0.4 % — HIGH (ref 0.1–0.3)
KETONES UR-MCNC: NEGATIVE — SIGNIFICANT CHANGE UP
LACTATE SERPL-SCNC: 1.8 MMOL/L — SIGNIFICANT CHANGE UP (ref 0.7–2)
LEUKOCYTE ESTERASE UR-ACNC: NEGATIVE — SIGNIFICANT CHANGE UP
LIDOCAIN IGE QN: 25 U/L — SIGNIFICANT CHANGE UP (ref 7–60)
LYMPHOCYTES # BLD AUTO: 0.58 K/UL — LOW (ref 1.2–3.4)
LYMPHOCYTES # BLD AUTO: 5.1 % — LOW (ref 20.5–51.1)
MCHC RBC-ENTMCNC: 28.2 PG — SIGNIFICANT CHANGE UP (ref 27–31)
MCHC RBC-ENTMCNC: 32.9 G/DL — SIGNIFICANT CHANGE UP (ref 32–37)
MCV RBC AUTO: 85.9 FL — SIGNIFICANT CHANGE UP (ref 81–99)
MONOCYTES # BLD AUTO: 0.41 K/UL — SIGNIFICANT CHANGE UP (ref 0.1–0.6)
MONOCYTES NFR BLD AUTO: 3.6 % — SIGNIFICANT CHANGE UP (ref 1.7–9.3)
NEUTROPHILS # BLD AUTO: 10.28 K/UL — HIGH (ref 1.4–6.5)
NEUTROPHILS NFR BLD AUTO: 90 % — HIGH (ref 42.2–75.2)
NITRITE UR-MCNC: NEGATIVE — SIGNIFICANT CHANGE UP
NRBC # BLD: 0 /100 WBCS — SIGNIFICANT CHANGE UP (ref 0–0)
PH UR: 5.5 — SIGNIFICANT CHANGE UP (ref 5–8)
PLATELET # BLD AUTO: 243 K/UL — SIGNIFICANT CHANGE UP (ref 130–400)
POTASSIUM SERPL-MCNC: 4.4 MMOL/L — SIGNIFICANT CHANGE UP (ref 3.5–5)
POTASSIUM SERPL-SCNC: 4.4 MMOL/L — SIGNIFICANT CHANGE UP (ref 3.5–5)
PROT SERPL-MCNC: 7.2 G/DL — SIGNIFICANT CHANGE UP (ref 6–8)
PROT UR-MCNC: ABNORMAL
RBC # BLD: 5.1 M/UL — SIGNIFICANT CHANGE UP (ref 4.2–5.4)
RBC # FLD: 11.9 % — SIGNIFICANT CHANGE UP (ref 11.5–14.5)
RBC CASTS # UR COMP ASSIST: 6 /HPF — HIGH (ref 0–4)
SODIUM SERPL-SCNC: 137 MMOL/L — SIGNIFICANT CHANGE UP (ref 135–146)
SP GR SPEC: 1.03 — HIGH (ref 1.01–1.03)
UROBILINOGEN FLD QL: SIGNIFICANT CHANGE UP
WBC # BLD: 11.42 K/UL — HIGH (ref 4.8–10.8)
WBC # FLD AUTO: 11.42 K/UL — HIGH (ref 4.8–10.8)
WBC UR QL: 2 /HPF — SIGNIFICANT CHANGE UP (ref 0–5)

## 2023-02-12 PROCEDURE — 81001 URINALYSIS AUTO W/SCOPE: CPT

## 2023-02-12 PROCEDURE — 96375 TX/PRO/DX INJ NEW DRUG ADDON: CPT

## 2023-02-12 PROCEDURE — 96376 TX/PRO/DX INJ SAME DRUG ADON: CPT

## 2023-02-12 PROCEDURE — 84703 CHORIONIC GONADOTROPIN ASSAY: CPT

## 2023-02-12 PROCEDURE — 80048 BASIC METABOLIC PNL TOTAL CA: CPT

## 2023-02-12 PROCEDURE — 85025 COMPLETE CBC W/AUTO DIFF WBC: CPT

## 2023-02-12 PROCEDURE — 83690 ASSAY OF LIPASE: CPT

## 2023-02-12 PROCEDURE — 96374 THER/PROPH/DIAG INJ IV PUSH: CPT

## 2023-02-12 PROCEDURE — 99284 EMERGENCY DEPT VISIT MOD MDM: CPT

## 2023-02-12 PROCEDURE — 80076 HEPATIC FUNCTION PANEL: CPT

## 2023-02-12 PROCEDURE — 36415 COLL VENOUS BLD VENIPUNCTURE: CPT

## 2023-02-12 PROCEDURE — 83605 ASSAY OF LACTIC ACID: CPT

## 2023-02-12 PROCEDURE — 99284 EMERGENCY DEPT VISIT MOD MDM: CPT | Mod: 25

## 2023-02-12 RX ORDER — ONDANSETRON 8 MG/1
4 TABLET, FILM COATED ORAL ONCE
Refills: 0 | Status: COMPLETED | OUTPATIENT
Start: 2023-02-12 | End: 2023-02-12

## 2023-02-12 RX ORDER — ONDANSETRON 8 MG/1
1 TABLET, FILM COATED ORAL
Qty: 21 | Refills: 0
Start: 2023-02-12 | End: 2023-02-18

## 2023-02-12 RX ORDER — SODIUM CHLORIDE 9 MG/ML
1000 INJECTION INTRAMUSCULAR; INTRAVENOUS; SUBCUTANEOUS ONCE
Refills: 0 | Status: COMPLETED | OUTPATIENT
Start: 2023-02-12 | End: 2023-02-12

## 2023-02-12 RX ORDER — METOCLOPRAMIDE HCL 10 MG
10 TABLET ORAL ONCE
Refills: 0 | Status: COMPLETED | OUTPATIENT
Start: 2023-02-12 | End: 2023-02-12

## 2023-02-12 RX ORDER — FAMOTIDINE 10 MG/ML
20 INJECTION INTRAVENOUS ONCE
Refills: 0 | Status: COMPLETED | OUTPATIENT
Start: 2023-02-12 | End: 2023-02-12

## 2023-02-12 RX ADMIN — ONDANSETRON 4 MILLIGRAM(S): 8 TABLET, FILM COATED ORAL at 08:48

## 2023-02-12 RX ADMIN — ONDANSETRON 4 MILLIGRAM(S): 8 TABLET, FILM COATED ORAL at 07:30

## 2023-02-12 RX ADMIN — SODIUM CHLORIDE 1000 MILLILITER(S): 9 INJECTION INTRAMUSCULAR; INTRAVENOUS; SUBCUTANEOUS at 08:46

## 2023-02-12 RX ADMIN — SODIUM CHLORIDE 1000 MILLILITER(S): 9 INJECTION INTRAMUSCULAR; INTRAVENOUS; SUBCUTANEOUS at 07:31

## 2023-02-12 RX ADMIN — Medication 104 MILLIGRAM(S): at 09:31

## 2023-02-12 RX ADMIN — FAMOTIDINE 20 MILLIGRAM(S): 10 INJECTION INTRAVENOUS at 07:30

## 2023-02-12 NOTE — ED PROVIDER NOTE - NS ED ROS FT
Constitutional: (-) fever  Eyes/ENT: (-) blurry vision, (-) epistaxis  Cardiovascular: (-) chest pain, (-) syncope  Respiratory: (-) cough, (-) shortness of breath  Gastrointestinal: (+)nausea, (+) vomiting, (-) diarrhea  Musculoskeletal: (-) neck pain, (-) back pain, (-) joint pain  Integumentary: (-) rash, (-) edema  Neurological: (-) headache, (-) altered mental status  Psychiatric: (-) hallucinations  Allergic/Immunologic: (-) pruritus

## 2023-02-12 NOTE — ED PROVIDER NOTE - PATIENT PORTAL LINK FT
You can access the FollowMyHealth Patient Portal offered by [facility] by registering at the following website: [email or web address]. By joining [facility]’s FollowMyHealth portal, you will also be able to view your health information using other applications (apps) compatible with our system.

## 2023-02-12 NOTE — ED ADULT NURSE NOTE - NSIMPLEMENTINTERV_GEN_ALL_ED
Implemented All Universal Safety Interventions:  [place] to call system. Call bell, personal items and telephone within reach. Instruct patient to call for assistance. Room bathroom lighting operational. Non-slip footwear when patient is off stretcher. Physically safe environment: no spills, clutter or unnecessary equipment. Stretcher in lowest position, wheels locked, appropriate side rails in place.

## 2023-02-12 NOTE — ED PROVIDER NOTE - ATTENDING APP SHARED VISIT CONTRIBUTION OF CARE
37-year-old female no past medical history presents with 1 day of nausea vomiting.  No abdominal pain.  No diarrhea.  No fever.  No urinary symptoms.  No chest pain shortness of breath.  Children with similar symptoms.    On exam, AFVSS, Well appearing, No acute distress, NCAT, EOMI, PERRLA, MMM, Neck supple, LCTAB, RRR nl s1s2 No mrg, Abdomen Soft NTND, AAOx3, No Focal Deficits, No LE edema or calf TTP,    A/P; nausea vomiting x1 day, no pain or tenderness concerning for acute appendicitis, suspected gastroenteritis, labs sent, pregnancy test negative, patient feels better status post IV fluid bolus and antiemetics given, DC home with supportive care advised, follow-up PMD 1 to 2 weeks, strict return precautions

## 2023-02-12 NOTE — ED PROVIDER NOTE - OBJECTIVE STATEMENT
37-year-old female no past medical history presents to the ED complaining of multiple episodes of nausea and vomiting since last night.  Patient states child had similar symptoms the day before.  Patient denies any abdominal pain, fever, chills, back pain or urinary symptoms.

## 2023-02-12 NOTE — ED PROVIDER NOTE - NS ED ATTENDING STATEMENT MOD
This was a shared visit with the MIC. I reviewed and verified the documentation and independently performed the documented:

## 2023-04-28 NOTE — OB PROVIDER DELIVERY SUMMARY - NSPROVIDERDELIVERYNOTE_OBGYN_ALL_OB_FT
Patient comes to clinic for follow up anticoagulation visit.  Last INR on 04/21 was 1.8.  Dose increased.   Today's INR is 2.3 and is within goal range.    Current warfarin total weekly dose of 18 mg verified.  Informed the INR result is within therapeutic range and instructed to maintain current dose per protocol. Discussed dose and return date of 05/02/23 for next INR. See Anticoagulation flowsheet.    Dr Gardner is in the office today supervising the treatment.    Instructed to contact the clinic with any unusual bleeding or bruising, any changes in medications, diet, health status, lifestyle, or any other changes, questions or concerns. Verbalized understanding of all discussed.     
Please see operative note for details.

## 2023-05-18 NOTE — OB RN PATIENT PROFILE - FALL HARM RISK CONCLUSION
Rheumatology Clinic Visit      Mayda Laura MRN# [id number]   YOB: 1981 Age: 41 year old      Date of visit: 5/18/23   PCP: Lilia Pereira  Nephrology: Dr. Darcy Borges    [facility] [facility] CHRISTY  --   --   --   --   --   --   --  Dwain Mccullough MD   Abdomen soft, nontender, nondistended, bowel sounds present in all 4 quadrants. Universal Safety Interventions

## 2023-06-23 NOTE — ED ADULT TRIAGE NOTE - MEANS OF ARRIVAL
Dietitian assessment due to auto wound consult per trauma protocol. Chart reviewed and determined there are no nutritional needs at this time. Patient with adequate po intakes. Nutrition services will sign off. Please consult nutrition services if further intervention needed.     stretcher

## 2023-11-02 ENCOUNTER — NON-APPOINTMENT (OUTPATIENT)
Age: 38
End: 2023-11-02

## 2023-11-04 ENCOUNTER — NON-APPOINTMENT (OUTPATIENT)
Age: 38
End: 2023-11-04

## 2023-11-04 ENCOUNTER — OUTPATIENT (OUTPATIENT)
Dept: OUTPATIENT SERVICES | Facility: HOSPITAL | Age: 38
LOS: 1 days | End: 2023-11-04
Payer: MEDICAID

## 2023-11-04 DIAGNOSIS — Z98.891 HISTORY OF UTERINE SCAR FROM PREVIOUS SURGERY: Chronic | ICD-10-CM

## 2023-11-04 DIAGNOSIS — Z12.31 ENCOUNTER FOR SCREENING MAMMOGRAM FOR MALIGNANT NEOPLASM OF BREAST: ICD-10-CM

## 2023-11-04 DIAGNOSIS — Z90.49 ACQUIRED ABSENCE OF OTHER SPECIFIED PARTS OF DIGESTIVE TRACT: Chronic | ICD-10-CM

## 2023-11-04 PROCEDURE — 77063 BREAST TOMOSYNTHESIS BI: CPT | Mod: 26

## 2023-11-04 PROCEDURE — 77063 BREAST TOMOSYNTHESIS BI: CPT

## 2023-11-04 PROCEDURE — 77067 SCR MAMMO BI INCL CAD: CPT

## 2023-11-04 PROCEDURE — 77067 SCR MAMMO BI INCL CAD: CPT | Mod: 26

## 2023-11-05 DIAGNOSIS — Z12.31 ENCOUNTER FOR SCREENING MAMMOGRAM FOR MALIGNANT NEOPLASM OF BREAST: ICD-10-CM

## 2023-11-20 ENCOUNTER — OUTPATIENT (OUTPATIENT)
Dept: OUTPATIENT SERVICES | Facility: HOSPITAL | Age: 38
LOS: 1 days | End: 2023-11-20
Payer: MEDICAID

## 2023-11-20 DIAGNOSIS — R92.8 OTHER ABNORMAL AND INCONCLUSIVE FINDINGS ON DIAGNOSTIC IMAGING OF BREAST: ICD-10-CM

## 2023-11-20 DIAGNOSIS — Z90.49 ACQUIRED ABSENCE OF OTHER SPECIFIED PARTS OF DIGESTIVE TRACT: Chronic | ICD-10-CM

## 2023-11-20 DIAGNOSIS — Z98.891 HISTORY OF UTERINE SCAR FROM PREVIOUS SURGERY: Chronic | ICD-10-CM

## 2023-11-20 PROCEDURE — 77061 BREAST TOMOSYNTHESIS UNI: CPT | Mod: 26

## 2023-11-20 PROCEDURE — G0279: CPT

## 2023-11-20 PROCEDURE — 76642 ULTRASOUND BREAST LIMITED: CPT | Mod: 26,LT

## 2023-11-20 PROCEDURE — 77065 DX MAMMO INCL CAD UNI: CPT | Mod: 26,LT

## 2023-11-20 PROCEDURE — 77065 DX MAMMO INCL CAD UNI: CPT | Mod: LT

## 2023-11-20 PROCEDURE — 76642 ULTRASOUND BREAST LIMITED: CPT | Mod: LT

## 2023-11-21 DIAGNOSIS — R92.8 OTHER ABNORMAL AND INCONCLUSIVE FINDINGS ON DIAGNOSTIC IMAGING OF BREAST: ICD-10-CM

## 2023-11-24 ENCOUNTER — NON-APPOINTMENT (OUTPATIENT)
Age: 38
End: 2023-11-24

## 2023-12-19 NOTE — ED ADULT NURSE NOTE - SUICIDE SCREENING QUESTION 1
"Outpatient Psychology Initial Evaluation    IDENTIFYING AND REFERRAL INFORMATION:  Yari Jin \"Luci\" is a 36 y.o. yo able-bodied  White female patient referred by Alyssa Urbano .     Start Time: 4 pm  End Time: 5 pm  Time Spent with Patient: 53 minutes    Session number 1 with this psychologist.    This is a virtual video visit.    Telephone/Televideo Informed Consent for Psychotherapy was reviewed with the patient as follows:  There are potential benefits and risks of the use of telephone or video-conferencing that differ from in-person sessions. Specifically, the telephone or televideo system we are using may not be HIPPA compliant and may present limits to patient confidentiality. Confidentiality still applies for telepsychology services, and nobody will record the session without your permission. You agree to use the telephone or video-conferencing platform selected for our virtual sessions, and I will explain how to use it.    1)             You need to use a webcam or smartphone during the session.  2)             It is important that you be in a quiet, private space that is free of distractions (including cell phone or other devices) during the session.  3)             It is important to use a secure internet connection rather than public/free Wi-Fi.  4)             It is important to be on time. If you need to cancel or change your tele-appointment, you must notify the psychologist in advance by phone or email.  5)             We need a back-up plan (e.g., phone number where you can be reached) to restart the session or to reschedule it, in the event of technical problems.  6)             We need a safety plan that includes at least one emergency contact and the closest emergency room to your location, in the event of a crisis situation.  7)             If you are not an adult, we need the permission of your parent or legal guardian (and their contact information) for you to participate in " "telepsychology sessions.    Understanding and verbal agreement was attested to by the patient.    The purpose of the meeting is for provider to understand patient's presenting problems, mental health hx, and other background information to assist with treatment planning. Patient stated an understanding of the information and agreed to the interview.    SECURE NOTE:  This document may not be released or reproduced in any form without the consent of either the Provider, the Provider´s  or the Chair of the Department of Psychiatry. This prohibition includes copying the document into any non-Restricted area of the Ambulatory Electronic Medical Record.      REASON  FOR REFERRAL:  Pregnancy loss, bereavement.    SOCIAL AND FAMILY HISTORY:  , no children. Recent pregnancy with stillbirth (Ana). Cites him as very supportive. He and her mother are primary supports. She also feels like they are open, communicative and supportive. She is active in her [employer], feels like it has been helpful in her grieving. Close best friend in [place] who she talks to daily. Felt almost overwhelmed by the outpouring of support from her friends after her loss. Parents are not together, both live close. In-laws live 5 minutes away.     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     CULTURAL CONSIDERATIONS:  L[employer] day, practicing. Cites her je[employer] paul as helpful coping tool.       HISTORY OF MENTAL HEALTH SYMPTOMS AND TREATMENT:  She is working on balancing sadness and focus on grieving with re-centering the rest of her identity. She feels like she's worked through much of her initial grieving process after her loss and is able to function in her day to day appropriately. She said she is comfortable talking about her baby \"I knew for 9 months\" during her pregnancy and is okay with seeking support as needed. She is experiencing high anxiety about her sister-in-law's pregnancy, which is also " "high risk. \"I feel like I transferred my anxiety about my pregnancy to her.\" She set boundaries and has found helpful tools like reminding herself she has no control over the situation each night. She knows it will be challenging for her. She mentioned several coping skills- cognitive coping like positive self-talk and reframing problematic thoughts, seeking support, using physical activity for stress management. Has good insight, does not necessarily recognize it in this situation.     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 Chose name for baby.      OBJECTIVE:  Mental Status:  Orientation and consciousness: [x ]oriented X 3 and attentive                                   [ ]other, explain:       Appearance/grooming (factors observable via video):            [x ]appropriate  [ ]poor grooming/hygiene bizarre appearance            [ ]other, explain:        Behavior: [x ]appropriate to context                 [ ]inappropriate and/or bizarre, explain:        Speech: [x]normal rate/rhythm   [ ]pressured speech   []slow                [ ]other,        Language: [x ]normal                  [ ]abnormalities noted, explain:        Mood: [ x]euthymic [ ]depressed [ ]dysphoric [ ]anxious      [ ]euphoric  [ ] other       Affect: [x]mood congruent      []restricted               []incongruent, explain:        Evidence of perceptual disturbances (hallucinations, illusions, etc.):                [ x]no                [ ]yes, explain:       Thought processes:                     [x ]normal and congruent                     [ ]other, explain:        Insight: [ ]good  [x ]adequate [ ]poor []questionable       Judgment: [ ]good  [x ]adequate [ ]poor []questionable       Fund of Knowledge:[ ]above average  [x ]average  [ ]below average    Suicidal/Homicidal assessment: No lethality issues noted or observed. Patient denied suicidal or homicidal ideation, intent, or plan.  Current risk of harm " low. Several protective factors.       SOCIAL DETERMINANTS OF HEALTH:  None noted.      INITIAL IMPRESSIONS:  Ms. Weber presented today via video telehealth for a psychosocial assessment after pregnancy loss at 36 weeks gestation. She reported going through the grief process and also feeling confident in her own strengths to get through it. She acknowledged this is a new experience; she is not sure how to interpret much of what she is going through. She wants a safe space to process it. She described a strong support system and a desire not to overburden loved ones with her needs, as they are going through their own grieving process. She agreed to follow-up with this psychologist for individual psychotherapy, with the noted focus of grief process and possibly emotional awareness interventions.     Diagnosis: complicated bereavement  History of stillbirth     Plan:   rtc in about 2 weeks for individual psychotherapy with this psychologist via video telehealth. Patient agreed to appointment frequency and plan. Patient has scheduling contact info to use as needed. Order placed and scheduling provider alerted.       Arnie Coelho, PhD   No

## 2024-04-20 ENCOUNTER — NON-APPOINTMENT (OUTPATIENT)
Age: 39
End: 2024-04-20

## 2024-06-18 ENCOUNTER — OUTPATIENT (OUTPATIENT)
Dept: OUTPATIENT SERVICES | Facility: HOSPITAL | Age: 39
LOS: 1 days | End: 2024-06-18
Payer: COMMERCIAL

## 2024-06-18 DIAGNOSIS — Z90.49 ACQUIRED ABSENCE OF OTHER SPECIFIED PARTS OF DIGESTIVE TRACT: Chronic | ICD-10-CM

## 2024-06-18 DIAGNOSIS — Z98.891 HISTORY OF UTERINE SCAR FROM PREVIOUS SURGERY: Chronic | ICD-10-CM

## 2024-06-18 DIAGNOSIS — K02.9 DENTAL CARIES, UNSPECIFIED: ICD-10-CM

## 2024-06-18 PROCEDURE — D0220: CPT

## 2024-06-18 PROCEDURE — D0140: CPT

## 2024-06-19 NOTE — OB PROVIDER H&P - NSLASTLIVEBIRTH_OBGYN_ALL_OB_DT
Detail Level: Zone Patient Specific Otc Recommendations (Will Not Stick From Patient To Patient): CeraVe or Cetaphil Rough and Bumpy moisturizing cream\\nFirst Aid Beauty KP Bump Eraser Body Scrub Patient Specific Otc Recommendations (Will Not Stick From Patient To Patient): CeraVe Benzoyl Peroxide: Apply and let it sit for about a minute then rinse off. Use before shaving. Detail Level: Simple 01-Jan-2016

## 2024-06-21 DIAGNOSIS — K02.9 DENTAL CARIES, UNSPECIFIED: ICD-10-CM

## 2024-12-03 NOTE — ED PROVIDER NOTE - DISCHARGE REVIEW MATERIAL PRESENTED
Patient had lower abdominal pain with pelvic lymphadenopathy for 2 weeks, without any fever or chills.  She developed fever/chills after lymph node biopsy.  Despite fever, she is clinically and systemically well, without any other focal symptoms other than her stable RLQ abdominal pain.  IV Unasyn was started, without effect on fever thus far. Blood cultures have no growth thus far.  If fever persist despite IV Unasyn and if blood cultures have no growth, will likely discontinue antibiotic.  Continue IV Unasyn for now.  Monitor temperature/WBC.  Follow-up on blood cultures.  If fever persist despite IV Unasyn and if blood cultures have no growth, will likely discontinue antibiotic.   .

## 2025-01-02 ENCOUNTER — NON-APPOINTMENT (OUTPATIENT)
Age: 40
End: 2025-01-02

## 2025-01-14 ENCOUNTER — APPOINTMENT (OUTPATIENT)
Dept: OBGYN | Facility: CLINIC | Age: 40
End: 2025-01-14

## 2025-01-29 ENCOUNTER — NON-APPOINTMENT (OUTPATIENT)
Age: 40
End: 2025-01-29

## 2025-04-24 ENCOUNTER — NON-APPOINTMENT (OUTPATIENT)
Age: 40
End: 2025-04-24

## 2025-07-07 ENCOUNTER — NON-APPOINTMENT (OUTPATIENT)
Age: 40
End: 2025-07-07